# Patient Record
Sex: FEMALE | Race: BLACK OR AFRICAN AMERICAN | NOT HISPANIC OR LATINO | Employment: FULL TIME | URBAN - METROPOLITAN AREA
[De-identification: names, ages, dates, MRNs, and addresses within clinical notes are randomized per-mention and may not be internally consistent; named-entity substitution may affect disease eponyms.]

---

## 2023-02-28 PROBLEM — I70.90 ATHEROSCLEROSIS: Status: ACTIVE | Noted: 2023-02-28

## 2023-02-28 PROBLEM — L30.9 ECZEMA: Status: ACTIVE | Noted: 2023-02-28

## 2023-02-28 PROBLEM — G47.00 INSOMNIA: Status: ACTIVE | Noted: 2023-02-28

## 2023-02-28 PROBLEM — I10 BENIGN HYPERTENSION: Status: ACTIVE | Noted: 2023-02-28

## 2023-02-28 PROBLEM — D50.9 IRON DEFICIENCY ANEMIA: Status: ACTIVE | Noted: 2023-02-28

## 2023-02-28 PROBLEM — E55.9 VITAMIN D DEFICIENCY: Status: ACTIVE | Noted: 2023-02-28

## 2023-02-28 PROBLEM — R20.2 PARESTHESIA: Status: ACTIVE | Noted: 2023-02-28

## 2023-02-28 PROBLEM — Z98.84 S/P BARIATRIC SURGERY: Status: ACTIVE | Noted: 2023-02-28

## 2023-02-28 PROBLEM — K42.9 UMBILICAL HERNIA: Status: ACTIVE | Noted: 2023-02-28

## 2023-02-28 PROBLEM — F41.9 ANXIETY: Status: ACTIVE | Noted: 2023-02-28

## 2023-02-28 PROBLEM — M25.50 ARTHRALGIA OF MULTIPLE SITES: Status: ACTIVE | Noted: 2023-02-28

## 2023-02-28 PROBLEM — R80.9 PROTEINURIA: Status: ACTIVE | Noted: 2023-02-28

## 2023-02-28 PROBLEM — D64.9 ANEMIA: Status: ACTIVE | Noted: 2023-02-28

## 2023-02-28 RX ORDER — LOSARTAN POTASSIUM AND HYDROCHLOROTHIAZIDE 25; 100 MG/1; MG/1
1 TABLET ORAL DAILY
COMMUNITY
Start: 2015-12-14 | End: 2023-04-21 | Stop reason: SDUPTHER

## 2023-02-28 RX ORDER — ACETAMINOPHEN 500 MG
50 TABLET ORAL DAILY
COMMUNITY
Start: 2021-10-29

## 2023-02-28 RX ORDER — ESCITALOPRAM OXALATE 20 MG/1
20 TABLET ORAL DAILY
COMMUNITY
Start: 2022-03-18 | End: 2023-04-21 | Stop reason: SDUPTHER

## 2023-02-28 RX ORDER — CYCLOBENZAPRINE HCL 10 MG
10 TABLET ORAL 2 TIMES DAILY PRN
COMMUNITY
Start: 2022-04-10 | End: 2024-04-24 | Stop reason: HOSPADM

## 2023-02-28 RX ORDER — TRAMADOL HYDROCHLORIDE 50 MG/1
50 TABLET ORAL EVERY 6 HOURS PRN
COMMUNITY
Start: 2016-12-24 | End: 2023-04-21 | Stop reason: ALTCHOICE

## 2023-02-28 RX ORDER — ACETAMINOPHEN 325 MG/1
325 TABLET ORAL EVERY 4 HOURS PRN
COMMUNITY
Start: 2022-04-10 | End: 2024-04-24 | Stop reason: HOSPADM

## 2023-02-28 RX ORDER — AMLODIPINE BESYLATE 10 MG/1
10 TABLET ORAL DAILY
COMMUNITY
Start: 2015-07-29 | End: 2023-04-21 | Stop reason: SDUPTHER

## 2023-02-28 RX ORDER — FERROUS SULFATE 325(65) MG
65 TABLET ORAL
COMMUNITY
Start: 2021-10-29

## 2023-04-14 ENCOUNTER — APPOINTMENT (OUTPATIENT)
Dept: PRIMARY CARE | Facility: CLINIC | Age: 38
End: 2023-04-14
Payer: COMMERCIAL

## 2023-04-21 ENCOUNTER — TELEMEDICINE (OUTPATIENT)
Dept: PRIMARY CARE | Facility: CLINIC | Age: 38
End: 2023-04-21
Payer: COMMERCIAL

## 2023-04-21 DIAGNOSIS — D50.0 IRON DEFICIENCY ANEMIA DUE TO CHRONIC BLOOD LOSS: Primary | ICD-10-CM

## 2023-04-21 DIAGNOSIS — Z98.84 S/P BARIATRIC SURGERY: ICD-10-CM

## 2023-04-21 DIAGNOSIS — E55.9 VITAMIN D DEFICIENCY: ICD-10-CM

## 2023-04-21 DIAGNOSIS — I10 BENIGN HYPERTENSION: ICD-10-CM

## 2023-04-21 DIAGNOSIS — F41.9 ANXIETY: ICD-10-CM

## 2023-04-21 PROCEDURE — 99214 OFFICE O/P EST MOD 30 MIN: CPT | Performed by: INTERNAL MEDICINE

## 2023-04-21 RX ORDER — LOSARTAN POTASSIUM AND HYDROCHLOROTHIAZIDE 25; 100 MG/1; MG/1
1 TABLET ORAL DAILY
Qty: 90 TABLET | Refills: 1 | Status: SHIPPED | OUTPATIENT
Start: 2023-04-21 | End: 2024-01-23

## 2023-04-21 RX ORDER — ESCITALOPRAM OXALATE 20 MG/1
20 TABLET ORAL DAILY
Qty: 90 TABLET | Refills: 1 | Status: SHIPPED | OUTPATIENT
Start: 2023-04-21 | End: 2024-01-29 | Stop reason: SDUPTHER

## 2023-04-21 RX ORDER — ORAL SEMAGLUTIDE 7 MG/1
7 TABLET ORAL DAILY
Qty: 30 TABLET | Refills: 2 | Status: SHIPPED | OUTPATIENT
Start: 2023-04-21 | End: 2024-01-23 | Stop reason: WASHOUT

## 2023-04-21 RX ORDER — AMLODIPINE BESYLATE 10 MG/1
10 TABLET ORAL DAILY
Qty: 90 TABLET | Refills: 1 | OUTPATIENT
Start: 2023-04-21 | End: 2024-01-07

## 2023-04-21 ASSESSMENT — ENCOUNTER SYMPTOMS
BLURRED VISION: 0
SWEATS: 0
PND: 0
NECK PAIN: 0
HEADACHES: 0
HYPERTENSION: 1
ORTHOPNEA: 0
PALPITATIONS: 0
SHORTNESS OF BREATH: 0

## 2023-04-21 NOTE — PROGRESS NOTES
Subjective   Patient ID: Malureese Rios is a 37 y.o. female who presents for Follow-up.  Hypertension  This is a chronic problem. The problem is controlled. Associated symptoms include anxiety. Pertinent negatives include no blurred vision, chest pain, headaches, malaise/fatigue, neck pain, orthopnea, palpitations, peripheral edema, PND, shortness of breath or sweats. Agents associated with hypertension include anorectics.       Review of Systems   Constitutional:  Negative for malaise/fatigue.   Eyes:  Negative for blurred vision.   Respiratory:  Negative for shortness of breath.    Cardiovascular:  Negative for chest pain, palpitations, orthopnea and PND.   Musculoskeletal:  Negative for neck pain.   Neurological:  Negative for headaches.       Objective   Physical Exam  Neurological:      Mental Status: She is alert.   Psychiatric:         Mood and Affect: Mood normal.         Assessment/Plan   Problem List Items Addressed This Visit          Circulatory    Benign hypertension    Relevant Medications    amLODIPine (Norvasc) 10 mg tablet    escitalopram (Lexapro) 20 mg tablet    losartan-hydrochlorothiazide (Hyzaar) 100-25 mg tablet    semaglutide (Rybelsus) 7 mg tablet       Digestive    S/P bariatric surgery    Relevant Medications    semaglutide (Rybelsus) 7 mg tablet       Endocrine/Metabolic    Vitamin D deficiency       Hematologic    Iron deficiency anemia - Primary    Relevant Orders    CBC    Comprehensive Metabolic Panel    Ferritin       Other    Anxiety        An interactive audio and video telecommunication system which permits real time communications between the patient (at the originating site) and provider (at the distant site) was utilized to provide this telehealth service.    Verbal consent was requested and obtained from the patient on the day of encounter.     HTN.  Well controlled.  Continue with current medications.  Check BP at home daily.  Report to us if the numbers are higher than  130/80.  Follow up on anemia  Recheck iron, TIBC and ferritin levels.  Continue with iron supplements.  Eat foods high in iron.      Joseline Remy MD

## 2023-04-28 ENCOUNTER — LAB (OUTPATIENT)
Dept: LAB | Facility: LAB | Age: 38
End: 2023-04-28
Payer: COMMERCIAL

## 2023-04-28 DIAGNOSIS — D50.0 IRON DEFICIENCY ANEMIA DUE TO CHRONIC BLOOD LOSS: ICD-10-CM

## 2023-04-28 LAB
ALANINE AMINOTRANSFERASE (SGPT) (U/L) IN SER/PLAS: 9 U/L (ref 7–45)
ALBUMIN (G/DL) IN SER/PLAS: 4 G/DL (ref 3.4–5)
ALKALINE PHOSPHATASE (U/L) IN SER/PLAS: 71 U/L (ref 33–110)
ANION GAP IN SER/PLAS: 10 MMOL/L (ref 10–20)
ASPARTATE AMINOTRANSFERASE (SGOT) (U/L) IN SER/PLAS: 12 U/L (ref 9–39)
BILIRUBIN TOTAL (MG/DL) IN SER/PLAS: 0.4 MG/DL (ref 0–1.2)
CALCIUM (MG/DL) IN SER/PLAS: 9.4 MG/DL (ref 8.6–10.6)
CARBON DIOXIDE, TOTAL (MMOL/L) IN SER/PLAS: 28 MMOL/L (ref 21–32)
CHLORIDE (MMOL/L) IN SER/PLAS: 105 MMOL/L (ref 98–107)
CREATININE (MG/DL) IN SER/PLAS: 0.8 MG/DL (ref 0.5–1.05)
ERYTHROCYTE DISTRIBUTION WIDTH (RATIO) BY AUTOMATED COUNT: 16.8 % (ref 11.5–14.5)
ERYTHROCYTE MEAN CORPUSCULAR HEMOGLOBIN CONCENTRATION (G/DL) BY AUTOMATED: 28.6 G/DL (ref 32–36)
ERYTHROCYTE MEAN CORPUSCULAR VOLUME (FL) BY AUTOMATED COUNT: 74 FL (ref 80–100)
ERYTHROCYTES (10*6/UL) IN BLOOD BY AUTOMATED COUNT: 3.87 X10E12/L (ref 4–5.2)
FERRITIN (UG/LL) IN SER/PLAS: 10 UG/L (ref 8–150)
GFR FEMALE: >90 ML/MIN/1.73M2
GLUCOSE (MG/DL) IN SER/PLAS: 83 MG/DL (ref 74–99)
HEMATOCRIT (%) IN BLOOD BY AUTOMATED COUNT: 28.7 % (ref 36–46)
HEMOGLOBIN (G/DL) IN BLOOD: 8.2 G/DL (ref 12–16)
LEUKOCYTES (10*3/UL) IN BLOOD BY AUTOMATED COUNT: 7 X10E9/L (ref 4.4–11.3)
NRBC (PER 100 WBCS) BY AUTOMATED COUNT: 0 /100 WBC (ref 0–0)
PLATELETS (10*3/UL) IN BLOOD AUTOMATED COUNT: 321 X10E9/L (ref 150–450)
POTASSIUM (MMOL/L) IN SER/PLAS: 3.9 MMOL/L (ref 3.5–5.3)
PROTEIN TOTAL: 7.6 G/DL (ref 6.4–8.2)
SODIUM (MMOL/L) IN SER/PLAS: 139 MMOL/L (ref 136–145)
UREA NITROGEN (MG/DL) IN SER/PLAS: 11 MG/DL (ref 6–23)

## 2023-04-28 PROCEDURE — 36415 COLL VENOUS BLD VENIPUNCTURE: CPT

## 2023-04-28 PROCEDURE — 85027 COMPLETE CBC AUTOMATED: CPT

## 2023-04-28 PROCEDURE — 80053 COMPREHEN METABOLIC PANEL: CPT

## 2023-04-28 PROCEDURE — 82728 ASSAY OF FERRITIN: CPT

## 2023-04-29 NOTE — RESULT ENCOUNTER NOTE
Left a detailed message with the patient.  She should start oral iron supplements, 65 mg BID and increase iron in the diet.  She should follow up with us in 3 months to recheck levels.

## 2024-01-07 ENCOUNTER — HOSPITAL ENCOUNTER (EMERGENCY)
Facility: HOSPITAL | Age: 39
Discharge: HOME | End: 2024-01-07
Attending: EMERGENCY MEDICINE
Payer: COMMERCIAL

## 2024-01-07 ENCOUNTER — APPOINTMENT (OUTPATIENT)
Dept: RADIOLOGY | Facility: HOSPITAL | Age: 39
End: 2024-01-07
Payer: COMMERCIAL

## 2024-01-07 ENCOUNTER — APPOINTMENT (OUTPATIENT)
Dept: CARDIOLOGY | Facility: HOSPITAL | Age: 39
End: 2024-01-07
Payer: COMMERCIAL

## 2024-01-07 VITALS
RESPIRATION RATE: 18 BRPM | DIASTOLIC BLOOD PRESSURE: 111 MMHG | HEART RATE: 80 BPM | OXYGEN SATURATION: 98 % | TEMPERATURE: 98.2 F | SYSTOLIC BLOOD PRESSURE: 174 MMHG

## 2024-01-07 DIAGNOSIS — D64.9 ANEMIA, UNSPECIFIED TYPE: ICD-10-CM

## 2024-01-07 DIAGNOSIS — R11.11 VOMITING WITHOUT NAUSEA, UNSPECIFIED VOMITING TYPE: ICD-10-CM

## 2024-01-07 DIAGNOSIS — R51.9 ACUTE NONINTRACTABLE HEADACHE, UNSPECIFIED HEADACHE TYPE: Primary | ICD-10-CM

## 2024-01-07 DIAGNOSIS — I10 BENIGN HYPERTENSION: ICD-10-CM

## 2024-01-07 LAB
ALBUMIN SERPL BCP-MCNC: 4.1 G/DL (ref 3.4–5)
ALP SERPL-CCNC: 54 U/L (ref 33–110)
ALT SERPL W P-5'-P-CCNC: 11 U/L (ref 7–45)
ANION GAP SERPL CALC-SCNC: 14 MMOL/L (ref 10–20)
AST SERPL W P-5'-P-CCNC: 19 U/L (ref 9–39)
B-HCG SERPL-ACNC: <2 MIU/ML
BASOPHILS # BLD AUTO: 0.08 X10*3/UL (ref 0–0.1)
BASOPHILS NFR BLD AUTO: 1.1 %
BILIRUB SERPL-MCNC: 0.3 MG/DL (ref 0–1.2)
BUN SERPL-MCNC: 11 MG/DL (ref 6–23)
CALCIUM SERPL-MCNC: 10 MG/DL (ref 8.6–10.3)
CARDIAC TROPONIN I PNL SERPL HS: 4 NG/L (ref 0–13)
CHLORIDE SERPL-SCNC: 105 MMOL/L (ref 98–107)
CO2 SERPL-SCNC: 23 MMOL/L (ref 21–32)
CREAT SERPL-MCNC: 0.68 MG/DL (ref 0.5–1.05)
EOSINOPHIL # BLD AUTO: 0.17 X10*3/UL (ref 0–0.7)
EOSINOPHIL NFR BLD AUTO: 2.4 %
ERYTHROCYTE [DISTWIDTH] IN BLOOD BY AUTOMATED COUNT: 18.6 % (ref 11.5–14.5)
FLUAV RNA RESP QL NAA+PROBE: NOT DETECTED
FLUBV RNA RESP QL NAA+PROBE: NOT DETECTED
GFR SERPL CREATININE-BSD FRML MDRD: >90 ML/MIN/1.73M*2
GLUCOSE SERPL-MCNC: 92 MG/DL (ref 74–99)
HCT VFR BLD AUTO: 25 % (ref 36–46)
HEMOCCULT SP1 STL QL: NEGATIVE
HGB BLD-MCNC: 7.3 G/DL (ref 12–16)
IMM GRANULOCYTES # BLD AUTO: 0.02 X10*3/UL (ref 0–0.7)
IMM GRANULOCYTES NFR BLD AUTO: 0.3 % (ref 0–0.9)
LYMPHOCYTES # BLD AUTO: 2.46 X10*3/UL (ref 1.2–4.8)
LYMPHOCYTES NFR BLD AUTO: 34.3 %
MCH RBC QN AUTO: 19.5 PG (ref 26–34)
MCHC RBC AUTO-ENTMCNC: 29.2 G/DL (ref 32–36)
MCV RBC AUTO: 67 FL (ref 80–100)
MONOCYTES # BLD AUTO: 0.46 X10*3/UL (ref 0.1–1)
MONOCYTES NFR BLD AUTO: 6.4 %
NEUTROPHILS # BLD AUTO: 3.98 X10*3/UL (ref 1.2–7.7)
NEUTROPHILS NFR BLD AUTO: 55.5 %
NRBC BLD-RTO: 0 /100 WBCS (ref 0–0)
PLATELET # BLD AUTO: 314 X10*3/UL (ref 150–450)
POTASSIUM SERPL-SCNC: 3.2 MMOL/L (ref 3.5–5.3)
PROT SERPL-MCNC: 8 G/DL (ref 6.4–8.2)
RBC # BLD AUTO: 3.75 X10*6/UL (ref 4–5.2)
SARS-COV-2 RNA RESP QL NAA+PROBE: NOT DETECTED
SODIUM SERPL-SCNC: 139 MMOL/L (ref 136–145)
WBC # BLD AUTO: 7.2 X10*3/UL (ref 4.4–11.3)

## 2024-01-07 PROCEDURE — 93005 ELECTROCARDIOGRAM TRACING: CPT

## 2024-01-07 PROCEDURE — 70450 CT HEAD/BRAIN W/O DYE: CPT

## 2024-01-07 PROCEDURE — 87636 SARSCOV2 & INF A&B AMP PRB: CPT | Performed by: EMERGENCY MEDICINE

## 2024-01-07 PROCEDURE — 2500000004 HC RX 250 GENERAL PHARMACY W/ HCPCS (ALT 636 FOR OP/ED): Performed by: EMERGENCY MEDICINE

## 2024-01-07 PROCEDURE — 84484 ASSAY OF TROPONIN QUANT: CPT | Performed by: EMERGENCY MEDICINE

## 2024-01-07 PROCEDURE — 96375 TX/PRO/DX INJ NEW DRUG ADDON: CPT

## 2024-01-07 PROCEDURE — 96361 HYDRATE IV INFUSION ADD-ON: CPT

## 2024-01-07 PROCEDURE — 84702 CHORIONIC GONADOTROPIN TEST: CPT | Performed by: EMERGENCY MEDICINE

## 2024-01-07 PROCEDURE — 70450 CT HEAD/BRAIN W/O DYE: CPT | Performed by: RADIOLOGY

## 2024-01-07 PROCEDURE — 99284 EMERGENCY DEPT VISIT MOD MDM: CPT | Performed by: EMERGENCY MEDICINE

## 2024-01-07 PROCEDURE — 96374 THER/PROPH/DIAG INJ IV PUSH: CPT

## 2024-01-07 PROCEDURE — 36415 COLL VENOUS BLD VENIPUNCTURE: CPT | Performed by: EMERGENCY MEDICINE

## 2024-01-07 PROCEDURE — 99285 EMERGENCY DEPT VISIT HI MDM: CPT | Mod: 25

## 2024-01-07 PROCEDURE — 80053 COMPREHEN METABOLIC PANEL: CPT | Performed by: EMERGENCY MEDICINE

## 2024-01-07 PROCEDURE — 85025 COMPLETE CBC W/AUTO DIFF WBC: CPT | Performed by: EMERGENCY MEDICINE

## 2024-01-07 PROCEDURE — 82270 OCCULT BLOOD FECES: CPT | Performed by: EMERGENCY MEDICINE

## 2024-01-07 PROCEDURE — 2500000001 HC RX 250 WO HCPCS SELF ADMINISTERED DRUGS (ALT 637 FOR MEDICARE OP): Performed by: EMERGENCY MEDICINE

## 2024-01-07 RX ORDER — PROCHLORPERAZINE EDISYLATE 5 MG/ML
10 INJECTION INTRAMUSCULAR; INTRAVENOUS ONCE
Status: COMPLETED | OUTPATIENT
Start: 2024-01-07 | End: 2024-01-07

## 2024-01-07 RX ORDER — AMLODIPINE BESYLATE 10 MG/1
10 TABLET ORAL DAILY
Status: DISCONTINUED | OUTPATIENT
Start: 2024-01-07 | End: 2024-01-07 | Stop reason: HOSPADM

## 2024-01-07 RX ORDER — AMLODIPINE BESYLATE 5 MG/1
10 TABLET ORAL DAILY
Qty: 60 TABLET | Refills: 0 | Status: SHIPPED | OUTPATIENT
Start: 2024-01-07 | End: 2024-05-20 | Stop reason: ALTCHOICE

## 2024-01-07 RX ORDER — DIPHENHYDRAMINE HYDROCHLORIDE 50 MG/ML
25 INJECTION INTRAMUSCULAR; INTRAVENOUS ONCE
Status: COMPLETED | OUTPATIENT
Start: 2024-01-07 | End: 2024-01-07

## 2024-01-07 RX ORDER — KETOROLAC TROMETHAMINE 30 MG/ML
30 INJECTION, SOLUTION INTRAMUSCULAR; INTRAVENOUS ONCE
Status: COMPLETED | OUTPATIENT
Start: 2024-01-07 | End: 2024-01-07

## 2024-01-07 RX ADMIN — SODIUM CHLORIDE 1000 ML: 9 INJECTION, SOLUTION INTRAVENOUS at 04:45

## 2024-01-07 RX ADMIN — KETOROLAC TROMETHAMINE 30 MG: 30 INJECTION, SOLUTION INTRAMUSCULAR; INTRAVENOUS at 04:45

## 2024-01-07 RX ADMIN — DIPHENHYDRAMINE HYDROCHLORIDE 25 MG: 50 INJECTION, SOLUTION INTRAMUSCULAR; INTRAVENOUS at 04:46

## 2024-01-07 RX ADMIN — AMLODIPINE BESYLATE 10 MG: 10 TABLET ORAL at 07:57

## 2024-01-07 RX ADMIN — PROCHLORPERAZINE EDISYLATE 10 MG: 5 INJECTION INTRAMUSCULAR; INTRAVENOUS at 04:46

## 2024-01-07 ASSESSMENT — PAIN SCALES - GENERAL: PAINLEVEL_OUTOF10: 10 - WORST POSSIBLE PAIN

## 2024-01-07 ASSESSMENT — PAIN DESCRIPTION - DESCRIPTORS: DESCRIPTORS: ACHING

## 2024-01-07 ASSESSMENT — PAIN - FUNCTIONAL ASSESSMENT: PAIN_FUNCTIONAL_ASSESSMENT: 0-10

## 2024-01-07 ASSESSMENT — PAIN DESCRIPTION - PAIN TYPE: TYPE: ACUTE PAIN

## 2024-01-07 ASSESSMENT — PAIN DESCRIPTION - LOCATION: LOCATION: HEAD

## 2024-01-07 ASSESSMENT — COLUMBIA-SUICIDE SEVERITY RATING SCALE - C-SSRS
1. IN THE PAST MONTH, HAVE YOU WISHED YOU WERE DEAD OR WISHED YOU COULD GO TO SLEEP AND NOT WAKE UP?: NO
2. HAVE YOU ACTUALLY HAD ANY THOUGHTS OF KILLING YOURSELF?: NO
6. HAVE YOU EVER DONE ANYTHING, STARTED TO DO ANYTHING, OR PREPARED TO DO ANYTHING TO END YOUR LIFE?: NO

## 2024-01-07 NOTE — ED PROVIDER NOTES
HPI   Chief Complaint   Patient presents with    Headache    Nausea       HPI  The patient presents with headache and blurred vision today.  She has known anemia for which he eats excess drop.  She does not take any iron.  She does states she has heavy periods patient has had blood in the stool.  Denies any fever, cough, vomiting.  Says she has mild nausea with this.                  Eugene Coma Scale Score: 15                  Patient History   Past Medical History:   Diagnosis Date    Essential (primary) hypertension 2013    Hypertension    Personal history of other endocrine, nutritional and metabolic disease 2022    History of morbid obesity    Personal history of other specified conditions     History of abdominal pain     Past Surgical History:   Procedure Laterality Date     SECTION, CLASSIC  2016     Section    OTHER SURGICAL HISTORY  2016    Ovarian Cystectomy    TUBAL LIGATION  2016    Tubal Ligation     No family history on file.  Social History     Tobacco Use    Smoking status: Not on file    Smokeless tobacco: Not on file   Substance Use Topics    Alcohol use: Not on file    Drug use: Not on file       Physical Exam   ED Triage Vitals [24 0415]   Temp Heart Rate Resp BP   36.8 °C (98.2 °F) 89 18 (!) 181/121      SpO2 Temp Source Heart Rate Source Patient Position   97 % Temporal Monitor --      BP Location FiO2 (%)     -- --       Physical Exam  Vitals and nursing note reviewed.   Constitutional:       General: She is not in acute distress.     Appearance: She is well-developed.   HENT:      Head: Normocephalic and atraumatic.   Eyes:      General: No visual field deficit.     Conjunctiva/sclera: Conjunctivae normal.   Cardiovascular:      Rate and Rhythm: Normal rate and regular rhythm.      Heart sounds: No murmur heard.  Pulmonary:      Effort: Pulmonary effort is normal. No respiratory distress.      Breath sounds: Normal breath sounds.    Abdominal:      Palpations: Abdomen is soft.      Tenderness: There is no abdominal tenderness.   Musculoskeletal:         General: No swelling.      Cervical back: Neck supple.   Skin:     General: Skin is warm and dry.      Capillary Refill: Capillary refill takes less than 2 seconds.   Neurological:      Mental Status: She is alert.      GCS: GCS eye subscore is 4. GCS verbal subscore is 5. GCS motor subscore is 5.      Cranial Nerves: No cranial nerve deficit, dysarthria or facial asymmetry.   Psychiatric:         Mood and Affect: Mood normal.         ED Course & MDM   Diagnoses as of 01/07/24 0794   Acute nonintractable headache, unspecified headache type   Vomiting without nausea, unspecified vomiting type   Anemia, unspecified type       Medical Decision Making  The patient has a benign neuroexam.  No visual field deficits.  No double vision.  The patient has a CT scan was unremarkable.NIH of 0.  I performed a rectal exam with nurse chaperone Kaley.  There was white material in her rectum.  No dark black tarry stools or hematochezia.    EKG interpreted by myself.  Normal sinus rhythm at a rate of 72 bpm.  Normal intervals.  Normal axis.  No signs of acute ischemia.      Procedure  Procedures     Dennis Esqueda MD  01/07/24 8382

## 2024-01-10 LAB
ATRIAL RATE: 72 BPM
P AXIS: 35 DEGREES
P OFFSET: 186 MS
P ONSET: 128 MS
PR INTERVAL: 186 MS
Q ONSET: 221 MS
QRS COUNT: 11 BEATS
QRS DURATION: 88 MS
QT INTERVAL: 398 MS
QTC CALCULATION(BAZETT): 435 MS
QTC FREDERICIA: 423 MS
R AXIS: 28 DEGREES
T AXIS: 63 DEGREES
T OFFSET: 420 MS
VENTRICULAR RATE: 72 BPM

## 2024-01-23 ENCOUNTER — OFFICE VISIT (OUTPATIENT)
Dept: PRIMARY CARE | Facility: CLINIC | Age: 39
End: 2024-01-23
Payer: COMMERCIAL

## 2024-01-23 ENCOUNTER — LAB (OUTPATIENT)
Dept: LAB | Facility: LAB | Age: 39
End: 2024-01-23
Payer: COMMERCIAL

## 2024-01-23 VITALS — DIASTOLIC BLOOD PRESSURE: 80 MMHG | BODY MASS INDEX: 34.54 KG/M2 | WEIGHT: 214 LBS | SYSTOLIC BLOOD PRESSURE: 140 MMHG

## 2024-01-23 DIAGNOSIS — Z98.84 S/P BARIATRIC SURGERY: ICD-10-CM

## 2024-01-23 DIAGNOSIS — D50.0 IRON DEFICIENCY ANEMIA DUE TO CHRONIC BLOOD LOSS: ICD-10-CM

## 2024-01-23 DIAGNOSIS — D50.9 IRON DEFICIENCY ANEMIA, UNSPECIFIED IRON DEFICIENCY ANEMIA TYPE: ICD-10-CM

## 2024-01-23 DIAGNOSIS — I10 BENIGN HYPERTENSION: ICD-10-CM

## 2024-01-23 DIAGNOSIS — I10 BENIGN HYPERTENSION: Primary | ICD-10-CM

## 2024-01-23 LAB
25(OH)D3 SERPL-MCNC: 45 NG/ML (ref 30–100)
BASOPHILS # BLD MANUAL: 0.14 X10*3/UL (ref 0–0.1)
BASOPHILS NFR BLD MANUAL: 1.7 %
EOSINOPHIL # BLD MANUAL: 0.07 X10*3/UL (ref 0–0.7)
EOSINOPHIL NFR BLD MANUAL: 0.9 %
ERYTHROCYTE [DISTWIDTH] IN BLOOD BY AUTOMATED COUNT: 20.1 % (ref 11.5–14.5)
EST. AVERAGE GLUCOSE BLD GHB EST-MCNC: 94 MG/DL
HBA1C MFR BLD: 4.9 %
HCT VFR BLD AUTO: 28.9 % (ref 36–46)
HGB BLD-MCNC: 8.2 G/DL (ref 12–16)
HYPOCHROMIA BLD QL SMEAR: ABNORMAL
IMM GRANULOCYTES # BLD AUTO: 0.02 X10*3/UL (ref 0–0.7)
IMM GRANULOCYTES NFR BLD AUTO: 0.2 % (ref 0–0.9)
LYMPHOCYTES # BLD MANUAL: 2.43 X10*3/UL (ref 1.2–4.8)
LYMPHOCYTES NFR BLD MANUAL: 29.3 %
MCH RBC QN AUTO: 19.6 PG (ref 26–34)
MCHC RBC AUTO-ENTMCNC: 28.4 G/DL (ref 32–36)
MCV RBC AUTO: 69 FL (ref 80–100)
MONOCYTES # BLD MANUAL: 0.28 X10*3/UL (ref 0.1–1)
MONOCYTES NFR BLD MANUAL: 3.4 %
NEUTS SEG # BLD MANUAL: 5.37 X10*3/UL (ref 1.2–7)
NEUTS SEG NFR BLD MANUAL: 64.7 %
NRBC BLD-RTO: 0 /100 WBCS (ref 0–0)
PLATELET # BLD AUTO: 385 X10*3/UL (ref 150–450)
RBC # BLD AUTO: 4.19 X10*6/UL (ref 4–5.2)
RBC MORPH BLD: ABNORMAL
TOTAL CELLS COUNTED BLD: 116
TSH SERPL-ACNC: 1.37 MIU/L (ref 0.44–3.98)
WBC # BLD AUTO: 8.3 X10*3/UL (ref 4.4–11.3)

## 2024-01-23 PROCEDURE — 80053 COMPREHEN METABOLIC PANEL: CPT

## 2024-01-23 PROCEDURE — 83036 HEMOGLOBIN GLYCOSYLATED A1C: CPT

## 2024-01-23 PROCEDURE — 99214 OFFICE O/P EST MOD 30 MIN: CPT | Performed by: INTERNAL MEDICINE

## 2024-01-23 PROCEDURE — 82306 VITAMIN D 25 HYDROXY: CPT

## 2024-01-23 PROCEDURE — 3077F SYST BP >= 140 MM HG: CPT | Performed by: INTERNAL MEDICINE

## 2024-01-23 PROCEDURE — 85027 COMPLETE CBC AUTOMATED: CPT

## 2024-01-23 PROCEDURE — 85007 BL SMEAR W/DIFF WBC COUNT: CPT

## 2024-01-23 PROCEDURE — 82728 ASSAY OF FERRITIN: CPT

## 2024-01-23 PROCEDURE — 83540 ASSAY OF IRON: CPT

## 2024-01-23 PROCEDURE — 36415 COLL VENOUS BLD VENIPUNCTURE: CPT

## 2024-01-23 PROCEDURE — 84443 ASSAY THYROID STIM HORMONE: CPT

## 2024-01-23 PROCEDURE — 83550 IRON BINDING TEST: CPT

## 2024-01-23 PROCEDURE — 3079F DIAST BP 80-89 MM HG: CPT | Performed by: INTERNAL MEDICINE

## 2024-01-23 PROCEDURE — 80061 LIPID PANEL: CPT

## 2024-01-23 RX ORDER — FAMOTIDINE 10 MG/ML
20 INJECTION INTRAVENOUS ONCE AS NEEDED
Status: CANCELLED | OUTPATIENT
Start: 2024-02-07

## 2024-01-23 RX ORDER — IRON SUCROSE 20 MG/ML
200 INJECTION, SOLUTION INTRAVENOUS
Qty: 10 ML | Refills: 1 | Status: SHIPPED
Start: 2024-01-23

## 2024-01-23 RX ORDER — ALBUTEROL SULFATE 0.83 MG/ML
3 SOLUTION RESPIRATORY (INHALATION) AS NEEDED
Status: CANCELLED | OUTPATIENT
Start: 2024-02-07

## 2024-01-23 RX ORDER — DIPHENHYDRAMINE HYDROCHLORIDE 50 MG/ML
50 INJECTION INTRAMUSCULAR; INTRAVENOUS AS NEEDED
Status: CANCELLED | OUTPATIENT
Start: 2024-02-07

## 2024-01-23 RX ORDER — EPINEPHRINE 0.3 MG/.3ML
0.3 INJECTION SUBCUTANEOUS EVERY 5 MIN PRN
Status: CANCELLED | OUTPATIENT
Start: 2024-02-07

## 2024-01-23 RX ORDER — LOSARTAN POTASSIUM AND HYDROCHLOROTHIAZIDE 12.5; 1 MG/1; MG/1
1 TABLET ORAL DAILY
Qty: 30 TABLET | Refills: 11 | Status: SHIPPED
Start: 2024-01-23 | End: 2024-01-24 | Stop reason: SDUPTHER

## 2024-01-23 ASSESSMENT — PATIENT HEALTH QUESTIONNAIRE - PHQ9
SUM OF ALL RESPONSES TO PHQ9 QUESTIONS 1 AND 2: 0
2. FEELING DOWN, DEPRESSED OR HOPELESS: NOT AT ALL
1. LITTLE INTEREST OR PLEASURE IN DOING THINGS: NOT AT ALL

## 2024-01-23 NOTE — PROGRESS NOTES
I saw and evaluated the patient. I personally obtained the key and critical portions of the history and physical exam or was physically present for key and critical portions performed by the resident/fellow. I reviewed the resident/fellow's documentation and discussed the patient with the resident/fellow. I agree with the resident/fellow's medical decision making as documented in the note.    Joseline Remy MD

## 2024-01-23 NOTE — PROGRESS NOTES
Chief Complaint:   Chief Complaint   Patient presents with    Follow-up    Med Refill      HPI    Malu Rios is a 38 y.o. year old female who presents to the clinic for a follow up visit. She states that over the last month she has been feeling more fatigued than usual. She was getting adequate sleep and was still waking up tired. It got to the point where she couldn't deal with it anymore and so she decided to go to the ED. At the ED, the patient was noted to have an anemia with a Hemoglobin of 7.5. This was suspected to be in the setting of Iron deficiency and she was discharged and told to follow up. She has been having normal menstrual periods. She has them every month and states that they are heavier in the first few days but the bleeding didn't seem unusually heavy and they lasted about 6-7 days. She did not notice any new bleeding or easy bruising.         Objective   Vitals:    01/23/24 1337   BP: 140/80        BMI Readings from Last 15 Encounters:   01/23/24 34.54 kg/m²   01/13/23 35.99 kg/m²   06/17/22 35.19 kg/m²   03/18/22 35.35 kg/m²   10/27/21 35.02 kg/m²        Physical Exam  Physical Exam  HENT:      Head: Normocephalic and atraumatic.   Cardiovascular:      Rate and Rhythm: Normal rate and regular rhythm.      Pulses: Normal pulses.      Heart sounds: Normal heart sounds.   Pulmonary:      Effort: Pulmonary effort is normal.      Breath sounds: Normal breath sounds.   Abdominal:      General: Abdomen is flat. Bowel sounds are normal.      Palpations: Abdomen is soft.   Neurological:      Mental Status: She is alert.          Labs:  Lab Results   Component Value Date    WBC 7.2 01/07/2024    HGB 7.3 (L) 01/07/2024    HCT 25.0 (L) 01/07/2024    MCV 67 (L) 01/07/2024     01/07/2024     Lab Results   Component Value Date    GLUCOSE 92 01/07/2024    CALCIUM 10.0 01/07/2024     01/07/2024    K 3.2 (L) 01/07/2024    CO2 23 01/07/2024     01/07/2024    BUN 11 01/07/2024     CREATININE 0.68 01/07/2024     Lab Results   Component Value Date    HGBA1C 5.4 09/20/2022      Lab Results   Component Value Date    HGBA1C 5.4 09/20/2022    HGBA1C 5.2 10/27/2021    HGBA1C 4.9 06/02/2020     Lab Results   Component Value Date    TSH 1.78 10/27/2021       Current Medications:  Current Outpatient Medications   Medication Sig Dispense Refill    acetaminophen (TylenoL) 325 mg tablet Take 1 tablet (325 mg) by mouth every 4 hours if needed for mild pain (1 - 3).      amLODIPine (Norvasc) 5 mg tablet Take 2 tablets (10 mg) by mouth once daily. 60 tablet 0    cholecalciferol (Vitamin D-3) 50 mcg (2,000 unit) capsule Take 1 capsule (50 mcg) by mouth once daily.      cyclobenzaprine (Flexeril) 10 mg tablet Take 1 tablet (10 mg) by mouth 2 times a day as needed for muscle spasms.      escitalopram (Lexapro) 20 mg tablet Take 1 tablet (20 mg) by mouth once daily. 90 tablet 1    ferrous sulfate 325 (65 Fe) MG tablet Take 1 tablet (65 mg of iron) by mouth once daily with a meal.      losartan-hydrochlorothiazide (Hyzaar) 100-25 mg tablet Take 1 tablet by mouth once daily. 90 tablet 1    semaglutide (Rybelsus) 7 mg tablet Take 7 mg by mouth once daily. 30 tablet 2     No current facility-administered medications for this visit.     Assessment and Plan    # HTN  - Current Regimen: Losartan-hydrochlorothiazide 100-25 mg every day, Amlodipine 5 mg every day   [ ] Decrease Losartan-hydrochlorothiazide 100-12.5 mg every day     # Hypokalemia   - K 3.2   [ ] Decrease hydrochlorothiazide dose as above  [ ] CMP to monitor     # Iron Deficiency   [ ] Venofer Infusions     # Weight Loss  2/2 Anemia and Poor Appetite/Fatigue  - 6 pound weight loss in 10 days  [ ] Monitor for continued weight loss    # Depression  - Escitalopram 20 mg every day     # Vitamin D Deficiency  - c/w Vitamin D 2000 Units every day     Labs:  - CBC, CMP, Urine Albumin, TSH w/ T4, Lipid Panel, HbA1c, Vitamin D due, requisite form provided      Please follow up in 3 months.

## 2024-01-24 DIAGNOSIS — I10 BENIGN HYPERTENSION: ICD-10-CM

## 2024-01-24 LAB
ALBUMIN SERPL BCP-MCNC: 4.4 G/DL (ref 3.4–5)
ALP SERPL-CCNC: 58 U/L (ref 33–110)
ALT SERPL W P-5'-P-CCNC: 9 U/L (ref 7–45)
ANION GAP SERPL CALC-SCNC: 17 MMOL/L (ref 10–20)
AST SERPL W P-5'-P-CCNC: 11 U/L (ref 9–39)
BILIRUB SERPL-MCNC: 0.3 MG/DL (ref 0–1.2)
BUN SERPL-MCNC: 14 MG/DL (ref 6–23)
CALCIUM SERPL-MCNC: 10.3 MG/DL (ref 8.6–10.6)
CHLORIDE SERPL-SCNC: 103 MMOL/L (ref 98–107)
CHOLEST SERPL-MCNC: 148 MG/DL (ref 0–199)
CHOLESTEROL/HDL RATIO: 2.5
CO2 SERPL-SCNC: 23 MMOL/L (ref 21–32)
CREAT SERPL-MCNC: 0.87 MG/DL (ref 0.5–1.05)
EGFRCR SERPLBLD CKD-EPI 2021: 87 ML/MIN/1.73M*2
FERRITIN SERPL-MCNC: 11 NG/ML (ref 8–150)
GLUCOSE SERPL-MCNC: 77 MG/DL (ref 74–99)
HDLC SERPL-MCNC: 60.3 MG/DL
IRON SATN MFR SERPL: ABNORMAL %
IRON SERPL-MCNC: <10 UG/DL (ref 35–150)
LDLC SERPL CALC-MCNC: 74 MG/DL
NON HDL CHOLESTEROL: 88 MG/DL (ref 0–149)
POTASSIUM SERPL-SCNC: 3.8 MMOL/L (ref 3.5–5.3)
PROT SERPL-MCNC: 8.3 G/DL (ref 6.4–8.2)
SODIUM SERPL-SCNC: 139 MMOL/L (ref 136–145)
TIBC SERPL-MCNC: ABNORMAL UG/DL
TRIGL SERPL-MCNC: 68 MG/DL (ref 0–149)
UIBC SERPL-MCNC: >450 UG/DL (ref 110–370)
VLDL: 14 MG/DL (ref 0–40)

## 2024-01-24 RX ORDER — LOSARTAN POTASSIUM AND HYDROCHLOROTHIAZIDE 12.5; 1 MG/1; MG/1
1 TABLET ORAL DAILY
Qty: 30 TABLET | Refills: 11 | Status: SHIPPED | OUTPATIENT
Start: 2024-01-24 | End: 2025-01-23

## 2024-01-29 DIAGNOSIS — F41.9 ANXIETY: ICD-10-CM

## 2024-01-29 DIAGNOSIS — I10 BENIGN HYPERTENSION: ICD-10-CM

## 2024-01-29 RX ORDER — ESCITALOPRAM OXALATE 20 MG/1
20 TABLET ORAL DAILY
Qty: 90 TABLET | Refills: 0 | Status: SHIPPED | OUTPATIENT
Start: 2024-01-29 | End: 2024-04-26

## 2024-02-01 ENCOUNTER — DOCUMENTATION (OUTPATIENT)
Dept: INFUSION THERAPY | Facility: CLINIC | Age: 39
End: 2024-02-01
Payer: COMMERCIAL

## 2024-02-22 ENCOUNTER — DOCUMENTATION (OUTPATIENT)
Dept: INFUSION THERAPY | Facility: CLINIC | Age: 39
End: 2024-02-22
Payer: COMMERCIAL

## 2024-03-15 ENCOUNTER — DOCUMENTATION (OUTPATIENT)
Dept: INFUSION THERAPY | Facility: CLINIC | Age: 39
End: 2024-03-15
Payer: COMMERCIAL

## 2024-04-24 ENCOUNTER — OFFICE VISIT (OUTPATIENT)
Dept: OBSTETRICS AND GYNECOLOGY | Facility: CLINIC | Age: 39
End: 2024-04-24
Payer: COMMERCIAL

## 2024-04-24 VITALS
BODY MASS INDEX: 35.65 KG/M2 | HEIGHT: 66 IN | DIASTOLIC BLOOD PRESSURE: 118 MMHG | WEIGHT: 221.8 LBS | SYSTOLIC BLOOD PRESSURE: 172 MMHG

## 2024-04-24 DIAGNOSIS — Z01.419 ENCOUNTER FOR WELL WOMAN EXAM WITH ROUTINE GYNECOLOGICAL EXAM: Primary | ICD-10-CM

## 2024-04-24 DIAGNOSIS — N89.8 VAGINAL ODOR: ICD-10-CM

## 2024-04-24 DIAGNOSIS — N93.9 ABNORMAL UTERINE BLEEDING (AUB): ICD-10-CM

## 2024-04-24 DIAGNOSIS — D50.9 IRON DEFICIENCY ANEMIA, UNSPECIFIED IRON DEFICIENCY ANEMIA TYPE: ICD-10-CM

## 2024-04-24 DIAGNOSIS — Z80.3 FAMILY HISTORY OF MALIGNANT NEOPLASM OF FEMALE BREAST: ICD-10-CM

## 2024-04-24 PROCEDURE — 1036F TOBACCO NON-USER: CPT | Performed by: OBSTETRICS & GYNECOLOGY

## 2024-04-24 PROCEDURE — 3077F SYST BP >= 140 MM HG: CPT | Performed by: OBSTETRICS & GYNECOLOGY

## 2024-04-24 PROCEDURE — 3080F DIAST BP >= 90 MM HG: CPT | Performed by: OBSTETRICS & GYNECOLOGY

## 2024-04-24 PROCEDURE — 99386 PREV VISIT NEW AGE 40-64: CPT | Performed by: OBSTETRICS & GYNECOLOGY

## 2024-04-24 PROCEDURE — 99214 OFFICE O/P EST MOD 30 MIN: CPT | Performed by: OBSTETRICS & GYNECOLOGY

## 2024-04-24 PROCEDURE — 87205 SMEAR GRAM STAIN: CPT

## 2024-04-24 ASSESSMENT — ENCOUNTER SYMPTOMS
PSYCHIATRIC NEGATIVE: 0
HEMATOLOGIC/LYMPHATIC NEGATIVE: 0
CARDIOVASCULAR NEGATIVE: 0
CONSTITUTIONAL NEGATIVE: 0
DEPRESSION: 0
OCCASIONAL FEELINGS OF UNSTEADINESS: 0
ALLERGIC/IMMUNOLOGIC NEGATIVE: 0
RESPIRATORY NEGATIVE: 0
MUSCULOSKELETAL NEGATIVE: 0
GASTROINTESTINAL NEGATIVE: 0
ENDOCRINE NEGATIVE: 0
EYES NEGATIVE: 0
LOSS OF SENSATION IN FEET: 0
NEUROLOGICAL NEGATIVE: 0

## 2024-04-24 ASSESSMENT — PATIENT HEALTH QUESTIONNAIRE - PHQ9
2. FEELING DOWN, DEPRESSED OR HOPELESS: NOT AT ALL
SUM OF ALL RESPONSES TO PHQ9 QUESTIONS 1 AND 2: 0
1. LITTLE INTEREST OR PLEASURE IN DOING THINGS: NOT AT ALL

## 2024-04-24 ASSESSMENT — PAIN SCALES - GENERAL: PAINLEVEL: 0-NO PAIN

## 2024-04-24 NOTE — PROGRESS NOTES
History Of Present Illness  Routine Gyn Exam  Malu Rios here for routine WWE.  Her medical hx is notable for HTN and KATY.  Hemoglobin 8.2 in 2024.  Menses are monthly, lasting 7d.  Wears pads-- on her heavy days she uses 6-7 pads. Pads are saturated; she does pass clots about the size of a golf ball. She has had to change her clothing/sheets.  She had never had any therapy/intervention.  LMP 2024.  She did have an Eab in early April.    She does feel like she has vaginal odor since her procedure.       Gynecologic History  Patient's last menstrual period was 2024 (exact date).  Contraception: none  Last Pap: 2020. Results were: norm/neg  Last mammogram: NA. Results were: NA  Last Colonoscopy:  NA. Results were: NA  Last DEXA: NA. Results were: NA  Lipid/DM: with PCP    Obstetric History  OB History    Para Term  AB Living   5 2 1 1 3 2   SAB IAB Ectopic Multiple Live Births   0 2 1   2      # Outcome Date GA Lbr David/2nd Weight Sex Delivery Anes PTL Lv   5 IAB            4 Ectopic 2017     Medical Valentina      3 Term 2009     CS-LTranv   MADI   2   32w0d    CS-LTranv   MADI      Complications:  labor ([facility])   1 IAB                 Past Medical History  She has a past medical history of Essential (primary) hypertension (2013), Personal history of other endocrine, nutritional and metabolic disease (2022), and Personal history of other specified conditions.    Surgical History  She has a past surgical history that includes  section, low transverse; Ovarian cyst removal (); Umbilical hernia repair; and Gastric bypass (N/A).     Social History  She reports that she has never smoked. She has never used smokeless tobacco. She reports that she does not currently use alcohol. She reports that she does not use drugs.    Family History  Family History   Problem Relation Name Age of Onset    Breast cancer Mother  48    Hypertension Father   "        Allergies  Ibuprofen, Influen tr-split 2005 vac (pf), and Lisinopril     Chaperone: present  Physical Exam  Constitutional:       Appearance: Normal appearance.   Pulmonary:      Effort: Pulmonary effort is normal.   Chest:   Breasts:     Right: Normal. No mass, nipple discharge or skin change.      Left: Normal. No mass, nipple discharge or skin change.   Abdominal:      General: Abdomen is flat. There is no distension.      Palpations: Abdomen is soft.      Tenderness: There is no abdominal tenderness.   Genitourinary:     Labia:         Right: No rash, tenderness or lesion.         Left: No rash, tenderness or lesion.       Urethra: No prolapse.      Vagina: Vaginal discharge (thin, yellow/white) present. No bleeding.      Cervix: No friability or lesion.      Uterus: Normal. Enlarged (10 weeks size). Not tender.       Adnexa:         Right: No mass, tenderness or fullness.          Left: No mass, tenderness or fullness.     Neurological:      General: No focal deficit present.      Mental Status: She is alert.   Psychiatric:         Mood and Affect: Mood normal.          Last Recorded Vitals  Blood pressure (!) 192/117, height 1.676 m (5' 6\"), weight 101 kg (221 lb 12.8 oz), last menstrual period 02/19/2024.    Assessment/Plan   Problem List Items Addressed This Visit       Iron deficiency anemia    Relevant Orders    US PELVIS TRANSABDOMINAL WITH TRANSVAGINAL     Other Visit Diagnoses       Encounter for well woman exam with routine gynecological exam    -  Primary    Family history of malignant neoplasm of female breast        Relevant Orders    BI mammo bilateral screening tomosynthesis    Vaginal odor        Relevant Orders    Vaginitis Gram Stain For Bacterial Vaginosis + Yeast    Abnormal uterine bleeding (AUB)        Relevant Orders    US PELVIS TRANSABDOMINAL WITH TRANSVAGINAL            Contraception: none  PAP: due 6/2025    Mammogram: ordered   Colonoscopy:  NA   DEXA: NA   Lipid/DM: obtained " through PCP    Odor/discharge: check vaginitis panel  AUB: plan for pelvic ultrasound.    HTN-- repeat BP still 170/110. Pt asymptomatic. She did not take her meds this AM. Planning to go home and take amlodipine- has a BP cuff and will take her BP one hour after taking her meds. If still >160/110, will contact PCP.     Dennis Key MD

## 2024-04-25 LAB
CLUE CELLS VAG LPF-#/AREA: PRESENT /[LPF]
NUGENT SCORE: 10
YEAST VAG WET PREP-#/AREA: ABNORMAL

## 2024-04-26 DIAGNOSIS — F41.9 ANXIETY: ICD-10-CM

## 2024-04-26 DIAGNOSIS — N89.8 VAGINAL ODOR: Primary | ICD-10-CM

## 2024-04-26 RX ORDER — ESCITALOPRAM OXALATE 20 MG/1
20 TABLET ORAL DAILY
Qty: 90 TABLET | Refills: 0 | Status: SHIPPED | OUTPATIENT
Start: 2024-04-26

## 2024-04-26 RX ORDER — METRONIDAZOLE 500 MG/1
500 TABLET ORAL 2 TIMES DAILY
Qty: 14 TABLET | Refills: 0 | Status: SHIPPED | OUTPATIENT
Start: 2024-04-26 | End: 2024-05-03

## 2024-05-06 ENCOUNTER — APPOINTMENT (OUTPATIENT)
Dept: RADIOLOGY | Facility: CLINIC | Age: 39
End: 2024-05-06
Payer: COMMERCIAL

## 2024-05-08 ENCOUNTER — APPOINTMENT (OUTPATIENT)
Dept: PRIMARY CARE | Facility: CLINIC | Age: 39
End: 2024-05-08
Payer: COMMERCIAL

## 2024-05-13 ENCOUNTER — HOSPITAL ENCOUNTER (OUTPATIENT)
Dept: RADIOLOGY | Facility: CLINIC | Age: 39
End: 2024-05-13
Payer: COMMERCIAL

## 2024-05-20 ENCOUNTER — OFFICE VISIT (OUTPATIENT)
Dept: PRIMARY CARE | Facility: CLINIC | Age: 39
End: 2024-05-20
Payer: COMMERCIAL

## 2024-05-20 ENCOUNTER — LAB (OUTPATIENT)
Dept: LAB | Facility: LAB | Age: 39
End: 2024-05-20
Payer: COMMERCIAL

## 2024-05-20 VITALS — BODY MASS INDEX: 35.35 KG/M2 | WEIGHT: 219 LBS | SYSTOLIC BLOOD PRESSURE: 139 MMHG | DIASTOLIC BLOOD PRESSURE: 89 MMHG

## 2024-05-20 DIAGNOSIS — E55.9 VITAMIN D DEFICIENCY: ICD-10-CM

## 2024-05-20 DIAGNOSIS — D50.9 IRON DEFICIENCY ANEMIA, UNSPECIFIED IRON DEFICIENCY ANEMIA TYPE: ICD-10-CM

## 2024-05-20 DIAGNOSIS — I10 BENIGN HYPERTENSION: ICD-10-CM

## 2024-05-20 DIAGNOSIS — Z98.84 S/P BARIATRIC SURGERY: ICD-10-CM

## 2024-05-20 DIAGNOSIS — G47.00 INSOMNIA, UNSPECIFIED TYPE: ICD-10-CM

## 2024-05-20 DIAGNOSIS — I10 BENIGN HYPERTENSION: Primary | ICD-10-CM

## 2024-05-20 LAB
ALBUMIN SERPL BCP-MCNC: 4.3 G/DL (ref 3.4–5)
ALP SERPL-CCNC: 58 U/L (ref 33–110)
ALT SERPL W P-5'-P-CCNC: 10 U/L (ref 7–45)
ANION GAP SERPL CALC-SCNC: 14 MMOL/L (ref 10–20)
AST SERPL W P-5'-P-CCNC: 13 U/L (ref 9–39)
BILIRUB SERPL-MCNC: 0.3 MG/DL (ref 0–1.2)
BUN SERPL-MCNC: 14 MG/DL (ref 6–23)
CALCIUM SERPL-MCNC: 9.7 MG/DL (ref 8.6–10.6)
CHLORIDE SERPL-SCNC: 104 MMOL/L (ref 98–107)
CO2 SERPL-SCNC: 24 MMOL/L (ref 21–32)
CREAT SERPL-MCNC: 0.78 MG/DL (ref 0.5–1.05)
EGFRCR SERPLBLD CKD-EPI 2021: >90 ML/MIN/1.73M*2
ERYTHROCYTE [DISTWIDTH] IN BLOOD BY AUTOMATED COUNT: 21.3 % (ref 11.5–14.5)
GLUCOSE SERPL-MCNC: 95 MG/DL (ref 74–99)
HCT VFR BLD AUTO: 31.7 % (ref 36–46)
HCV AB SER QL: NONREACTIVE
HGB BLD-MCNC: 9.6 G/DL (ref 12–16)
MCH RBC QN AUTO: 21.7 PG (ref 26–34)
MCHC RBC AUTO-ENTMCNC: 30.3 G/DL (ref 32–36)
MCV RBC AUTO: 72 FL (ref 80–100)
NRBC BLD-RTO: 0 /100 WBCS (ref 0–0)
PLATELET # BLD AUTO: 351 X10*3/UL (ref 150–450)
POTASSIUM SERPL-SCNC: 3.4 MMOL/L (ref 3.5–5.3)
PROT SERPL-MCNC: 7.9 G/DL (ref 6.4–8.2)
RBC # BLD AUTO: 4.42 X10*6/UL (ref 4–5.2)
SODIUM SERPL-SCNC: 139 MMOL/L (ref 136–145)
WBC # BLD AUTO: 9.1 X10*3/UL (ref 4.4–11.3)

## 2024-05-20 PROCEDURE — 86803 HEPATITIS C AB TEST: CPT

## 2024-05-20 PROCEDURE — 85027 COMPLETE CBC AUTOMATED: CPT

## 2024-05-20 PROCEDURE — 36415 COLL VENOUS BLD VENIPUNCTURE: CPT

## 2024-05-20 PROCEDURE — 3075F SYST BP GE 130 - 139MM HG: CPT | Performed by: INTERNAL MEDICINE

## 2024-05-20 PROCEDURE — 99214 OFFICE O/P EST MOD 30 MIN: CPT | Performed by: INTERNAL MEDICINE

## 2024-05-20 PROCEDURE — 3079F DIAST BP 80-89 MM HG: CPT | Performed by: INTERNAL MEDICINE

## 2024-05-20 PROCEDURE — 80053 COMPREHEN METABOLIC PANEL: CPT

## 2024-05-20 RX ORDER — AMLODIPINE BESYLATE 10 MG/1
10 TABLET ORAL DAILY
Qty: 30 TABLET | Refills: 2 | Status: SHIPPED | OUTPATIENT
Start: 2024-05-20 | End: 2024-11-16

## 2024-05-20 ASSESSMENT — ENCOUNTER SYMPTOMS
GASTROINTESTINAL NEGATIVE: 1
SLEEP DISTURBANCE: 1
CONSTITUTIONAL NEGATIVE: 1
CARDIOVASCULAR NEGATIVE: 1
RESPIRATORY NEGATIVE: 1

## 2024-05-20 NOTE — PATIENT INSTRUCTIONS
Take Losartan in the morning  Take Amlodipine in the evening  Try to do 10-12 hrs in between these 2 medications  Valerian root capsules  Good for relaxation

## 2024-05-20 NOTE — PROGRESS NOTES
Chief Complaint:   Chief Complaint   Patient presents with    Follow-up      HPI    Malu Rios is a 38 y.o. year old female who presents to the clinic for follow up.  The patient is here for a follow up on chronic medical problems.  His medications were reviewed, pharmacy updated, notes reviewed, prior labs reviewed.      Past Medical History   Past Medical History:   Diagnosis Date    Essential (primary) hypertension 2013    Hypertension    Personal history of other endocrine, nutritional and metabolic disease 2022    History of morbid obesity    Personal history of other specified conditions     History of abdominal pain      Past Surgical History:   Past Surgical History:   Procedure Laterality Date     SECTION, LOW TRANSVERSE      x2    GASTRIC BYPASS N/A     gastric sleeve    OVARIAN CYST REMOVAL      with rght salpingectomy    UMBILICAL HERNIA REPAIR       Family History:   Family History   Problem Relation Name Age of Onset    Breast cancer Mother  48    Hypertension Father       Social History:   Tobacco Use: Low Risk  (2024)    Patient History     Smoking Tobacco Use: Never     Smokeless Tobacco Use: Never     Passive Exposure: Not on file      Social History     Substance and Sexual Activity   Alcohol Use Not Currently        Allergies:   Allergies   Allergen Reactions    Ibuprofen Unknown     Due to gastric sleeve surgery    Influen Tr-Split  Vac (Pf) Anaphylaxis    Lisinopril Anaphylaxis and Angioedema        ROS   Review of Systems   Constitutional: Negative.    Respiratory: Negative.     Cardiovascular: Negative.    Gastrointestinal: Negative.    Psychiatric/Behavioral:  Positive for sleep disturbance.         Objective   Vitals:    24 1507   BP: 139/89      BMI Readings from Last 15 Encounters:   24 35.35 kg/m²   24 35.80 kg/m²   24 34.54 kg/m²   23 35.99 kg/m²   22 35.19 kg/m²   22 35.35 kg/m²   10/27/21 35.02 kg/m²     "  99.3 kg (219 lb) (5/20/2024  3:07 PM)      Physical Exam  Physical Exam  Constitutional:       Appearance: She is well-developed.   Cardiovascular:      Rate and Rhythm: Normal rate and regular rhythm.      Heart sounds: Normal heart sounds. No murmur heard.  Pulmonary:      Effort: Pulmonary effort is normal.      Breath sounds: Normal breath sounds.   Abdominal:      General: Bowel sounds are normal.      Palpations: Abdomen is soft.          Labs:  CBC:  Recent Labs     01/23/24 1425 01/07/24  0430 04/28/23  1052   WBC 8.3 7.2 7.0   HGB 8.2* 7.3* 8.2*   HCT 28.9* 25.0* 28.7*    314 321   MCV 69* 67* 74*     CMP:  Recent Labs     01/23/24 1425 01/07/24  0430 04/28/23  1052    139 139   K 3.8 3.2* 3.9    105 105   CO2 23 23 28   ANIONGAP 17 14 10   BUN 14 11 11   CREATININE 0.87 0.68 0.80   EGFR 87 >90  --    GLUCOSE 77 92 83     Recent Labs     01/23/24  1425 01/07/24  0430 04/28/23  1052 06/17/22  1500 11/30/21  0500   ALBUMIN 4.4 4.1 4.0   < > 3.6   ALKPHOS 58 54 71   < > 56   ALT 9 11 9   < > 10   AST 11 19 12   < > 13   BILITOT 0.3 0.3 0.4   < > 0.2   LIPASE  --   --   --   --  22    < > = values in this interval not displayed.     Calcium/Phos:   Lab Results   Component Value Date    CALCIUM 10.3 01/23/2024      COAG: No results for input(s): \"INR\", \"DDIMERVTE\" in the last 07775 hours.  CRP: No results found for: \"CRP\"   [unfilled]   ENDO:  Recent Labs     01/23/24  1425 09/20/22  1058 10/27/21  1430 06/02/20  1230   TSH 1.37  --  1.78  --    HGBA1C 4.9 5.4 5.2 4.9      CARDIAC:   Recent Labs     01/07/24  0430   TROPHS 4     Recent Labs     01/23/24 1425 09/20/22  1058 10/27/21  1430   CHOL 148 143 150   LDLF  --  58  --    LDLCALC 74  --   --    HDL 60.3 52.0 63.1   TRIG 68 163*  --      No data recorded    Current Medications:  Current Outpatient Medications   Medication Sig Dispense Refill    escitalopram (Lexapro) 20 mg tablet TAKE 1 TABLET BY MOUTH EVERY DAY 90 tablet 0    " losartan-hydrochlorothiazide (Hyzaar) 100-12.5 mg tablet Take 1 tablet by mouth once daily. 30 tablet 11    amLODIPine (Norvasc) 10 mg tablet Take 1 tablet (10 mg) by mouth once daily. 30 tablet 2    cholecalciferol (Vitamin D-3) 50 mcg (2,000 unit) capsule Take 1 capsule (50 mcg) by mouth once daily.      ferrous sulfate 325 (65 Fe) MG tablet Take 1 tablet (65 mg of iron) by mouth once daily with a meal.      iron sucrose (Venofer) 200 mg iron/10 mL injection Infuse 10 mL (200 mg) over 5 minutes into a venous catheter 1 (one) time per week. 10 mL 1     No current facility-administered medications for this visit.       Assessment and Plan  Malu was seen today for follow-up.  Diagnoses and all orders for this visit:  Benign hypertension (Primary)  -     amLODIPine (Norvasc) 10 mg tablet; Take 1 tablet (10 mg) by mouth once daily.  -     CBC; Future  -     Comprehensive metabolic panel; Future  -     Hepatitis C antibody; Future  S/P bariatric surgery  Insomnia, unspecified type  -     amLODIPine (Norvasc) 10 mg tablet; Take 1 tablet (10 mg) by mouth once daily.  -     CBC; Future  -     Comprehensive metabolic panel; Future  -     Hepatitis C antibody; Future  Iron deficiency anemia, unspecified iron deficiency anemia type  -     amLODIPine (Norvasc) 10 mg tablet; Take 1 tablet (10 mg) by mouth once daily.  -     CBC; Future  -     Comprehensive metabolic panel; Future  -     Hepatitis C antibody; Future  Vitamin D deficiency     HTN  Not at the goal   Has had high numbers at home, but only during certain times of the day  She has been taking both meds at the same time  Recommend to spread them apart and continue close monitoring    Anxiety, stress related  Job, marriage in a week  She has poor sleep  Recommend natural Valerian root supplements  Avoid other medications at this time    Anemia  Was supposed to get iron infusions, but hasn't been contacted by anyone  Has been taking iron supplements orally  Recommend  repeat CBC today  If still low, we will resubmit for infusions      Immunizations:    There is no immunization history on file for this patient.  Please follow up in 3 months

## 2024-05-21 NOTE — RESULT ENCOUNTER NOTE
Good morning Malu,   Your blood work showed slightly low potassium level, please increase potassium in the diet, more bananas, baked potatoes with skin, orange juice, dried fruits  Your hemoglobin is better  Continue with iron supplements.  Best,  Dr. Trevizo

## 2024-05-29 ENCOUNTER — TELEPHONE (OUTPATIENT)
Dept: OBSTETRICS AND GYNECOLOGY | Facility: CLINIC | Age: 39
End: 2024-05-29
Payer: COMMERCIAL

## 2024-05-29 NOTE — TELEPHONE ENCOUNTER
Called patient regarding refill request for flagyl  Left  for patient to return call    CHELA RangelN RN

## 2024-06-05 ENCOUNTER — APPOINTMENT (OUTPATIENT)
Dept: RADIOLOGY | Facility: CLINIC | Age: 39
End: 2024-06-05
Payer: COMMERCIAL

## 2024-06-13 DIAGNOSIS — D50.9 IRON DEFICIENCY ANEMIA, UNSPECIFIED IRON DEFICIENCY ANEMIA TYPE: ICD-10-CM

## 2024-06-13 DIAGNOSIS — G47.00 INSOMNIA, UNSPECIFIED TYPE: ICD-10-CM

## 2024-06-13 DIAGNOSIS — I10 BENIGN HYPERTENSION: ICD-10-CM

## 2024-06-13 RX ORDER — AMLODIPINE BESYLATE 10 MG/1
10 TABLET ORAL DAILY
Qty: 90 TABLET | Refills: 0 | Status: SHIPPED | OUTPATIENT
Start: 2024-06-13

## 2024-06-21 ENCOUNTER — APPOINTMENT (OUTPATIENT)
Dept: RADIOLOGY | Facility: HOSPITAL | Age: 39
End: 2024-06-21
Payer: COMMERCIAL

## 2024-06-21 ENCOUNTER — APPOINTMENT (OUTPATIENT)
Dept: RADIOLOGY | Facility: CLINIC | Age: 39
End: 2024-06-21
Payer: COMMERCIAL

## 2024-07-21 DIAGNOSIS — F41.9 ANXIETY: ICD-10-CM

## 2024-07-22 RX ORDER — ESCITALOPRAM OXALATE 20 MG/1
20 TABLET ORAL DAILY
Qty: 90 TABLET | Refills: 0 | Status: SHIPPED | OUTPATIENT
Start: 2024-07-22

## 2024-08-20 ENCOUNTER — APPOINTMENT (OUTPATIENT)
Dept: PRIMARY CARE | Facility: CLINIC | Age: 39
End: 2024-08-20
Payer: COMMERCIAL

## 2024-09-07 DIAGNOSIS — I10 BENIGN HYPERTENSION: ICD-10-CM

## 2024-09-08 RX ORDER — AMLODIPINE BESYLATE 10 MG/1
10 TABLET ORAL DAILY
Qty: 90 TABLET | Refills: 0 | Status: SHIPPED | OUTPATIENT
Start: 2024-09-08

## 2024-10-11 ENCOUNTER — APPOINTMENT (OUTPATIENT)
Dept: PRIMARY CARE | Facility: CLINIC | Age: 39
End: 2024-10-11
Payer: COMMERCIAL

## 2024-10-18 ENCOUNTER — LAB (OUTPATIENT)
Dept: LAB | Facility: LAB | Age: 39
End: 2024-10-18
Payer: COMMERCIAL

## 2024-10-18 ENCOUNTER — APPOINTMENT (OUTPATIENT)
Dept: PRIMARY CARE | Facility: CLINIC | Age: 39
End: 2024-10-18
Payer: COMMERCIAL

## 2024-10-18 VITALS
WEIGHT: 206 LBS | HEART RATE: 82 BPM | BODY MASS INDEX: 33.11 KG/M2 | DIASTOLIC BLOOD PRESSURE: 107 MMHG | HEIGHT: 66 IN | SYSTOLIC BLOOD PRESSURE: 158 MMHG

## 2024-10-18 DIAGNOSIS — I10 BENIGN HYPERTENSION: Primary | ICD-10-CM

## 2024-10-18 DIAGNOSIS — F41.9 ANXIETY: ICD-10-CM

## 2024-10-18 DIAGNOSIS — D50.9 IRON DEFICIENCY ANEMIA, UNSPECIFIED IRON DEFICIENCY ANEMIA TYPE: ICD-10-CM

## 2024-10-18 DIAGNOSIS — I10 BENIGN HYPERTENSION: ICD-10-CM

## 2024-10-18 LAB
ALBUMIN SERPL BCP-MCNC: 4.6 G/DL (ref 3.4–5)
ALP SERPL-CCNC: 79 U/L (ref 33–110)
ALT SERPL W P-5'-P-CCNC: 30 U/L (ref 7–45)
ANION GAP SERPL CALC-SCNC: 15 MMOL/L (ref 10–20)
AST SERPL W P-5'-P-CCNC: 24 U/L (ref 9–39)
BASOPHILS # BLD AUTO: 0.05 X10*3/UL (ref 0–0.1)
BASOPHILS NFR BLD AUTO: 0.5 %
BILIRUB SERPL-MCNC: 0.3 MG/DL (ref 0–1.2)
BUN SERPL-MCNC: 34 MG/DL (ref 6–23)
CALCIUM SERPL-MCNC: 9.8 MG/DL (ref 8.6–10.6)
CHLORIDE SERPL-SCNC: 109 MMOL/L (ref 98–107)
CO2 SERPL-SCNC: 21 MMOL/L (ref 21–32)
CREAT SERPL-MCNC: 1.19 MG/DL (ref 0.5–1.05)
EGFRCR SERPLBLD CKD-EPI 2021: 59 ML/MIN/1.73M*2
EOSINOPHIL # BLD AUTO: 0.07 X10*3/UL (ref 0–0.7)
EOSINOPHIL NFR BLD AUTO: 0.6 %
ERYTHROCYTE [DISTWIDTH] IN BLOOD BY AUTOMATED COUNT: 17.1 % (ref 11.5–14.5)
GLUCOSE SERPL-MCNC: 94 MG/DL (ref 74–99)
HCT VFR BLD AUTO: 33.7 % (ref 36–46)
HGB BLD-MCNC: 10 G/DL (ref 12–16)
IMM GRANULOCYTES # BLD AUTO: 0.03 X10*3/UL (ref 0–0.7)
IMM GRANULOCYTES NFR BLD AUTO: 0.3 % (ref 0–0.9)
LYMPHOCYTES # BLD AUTO: 2.69 X10*3/UL (ref 1.2–4.8)
LYMPHOCYTES NFR BLD AUTO: 24.9 %
MCH RBC QN AUTO: 22.2 PG (ref 26–34)
MCHC RBC AUTO-ENTMCNC: 29.7 G/DL (ref 32–36)
MCV RBC AUTO: 75 FL (ref 80–100)
MONOCYTES # BLD AUTO: 0.81 X10*3/UL (ref 0.1–1)
MONOCYTES NFR BLD AUTO: 7.5 %
NEUTROPHILS # BLD AUTO: 7.17 X10*3/UL (ref 1.2–7.7)
NEUTROPHILS NFR BLD AUTO: 66.2 %
NRBC BLD-RTO: 0 /100 WBCS (ref 0–0)
PLATELET # BLD AUTO: 269 X10*3/UL (ref 150–450)
POTASSIUM SERPL-SCNC: 3.5 MMOL/L (ref 3.5–5.3)
PROT SERPL-MCNC: 8.6 G/DL (ref 6.4–8.2)
RBC # BLD AUTO: 4.51 X10*6/UL (ref 4–5.2)
SODIUM SERPL-SCNC: 141 MMOL/L (ref 136–145)
WBC # BLD AUTO: 10.8 X10*3/UL (ref 4.4–11.3)

## 2024-10-18 PROCEDURE — 82570 ASSAY OF URINE CREATININE: CPT

## 2024-10-18 PROCEDURE — 3077F SYST BP >= 140 MM HG: CPT | Performed by: INTERNAL MEDICINE

## 2024-10-18 PROCEDURE — 80053 COMPREHEN METABOLIC PANEL: CPT

## 2024-10-18 PROCEDURE — 3008F BODY MASS INDEX DOCD: CPT | Performed by: INTERNAL MEDICINE

## 2024-10-18 PROCEDURE — 85025 COMPLETE CBC W/AUTO DIFF WBC: CPT

## 2024-10-18 PROCEDURE — 36415 COLL VENOUS BLD VENIPUNCTURE: CPT

## 2024-10-18 PROCEDURE — 1036F TOBACCO NON-USER: CPT | Performed by: INTERNAL MEDICINE

## 2024-10-18 PROCEDURE — 3080F DIAST BP >= 90 MM HG: CPT | Performed by: INTERNAL MEDICINE

## 2024-10-18 PROCEDURE — 99214 OFFICE O/P EST MOD 30 MIN: CPT | Performed by: INTERNAL MEDICINE

## 2024-10-18 PROCEDURE — 82043 UR ALBUMIN QUANTITATIVE: CPT

## 2024-10-18 RX ORDER — AMLODIPINE BESYLATE 10 MG/1
10 TABLET ORAL DAILY
Qty: 90 TABLET | Refills: 1 | Status: SHIPPED | OUTPATIENT
Start: 2024-10-18

## 2024-10-18 RX ORDER — ESCITALOPRAM OXALATE 20 MG/1
20 TABLET ORAL DAILY
Qty: 90 TABLET | Refills: 1 | Status: SHIPPED | OUTPATIENT
Start: 2024-10-18

## 2024-10-18 RX ORDER — LOSARTAN POTASSIUM AND HYDROCHLOROTHIAZIDE 12.5; 1 MG/1; MG/1
1 TABLET ORAL DAILY
Qty: 90 TABLET | Refills: 1 | Status: SHIPPED | OUTPATIENT
Start: 2024-10-18 | End: 2025-04-16

## 2024-10-18 ASSESSMENT — ENCOUNTER SYMPTOMS
WEAKNESS: 0
ACTIVITY CHANGE: 0
CONFUSION: 0
AGITATION: 0
LIGHT-HEADEDNESS: 0
ABDOMINAL DISTENTION: 0
VOMITING: 0
DIARRHEA: 0
SHORTNESS OF BREATH: 0
FATIGUE: 0
NAUSEA: 0
FEVER: 0
ABDOMINAL PAIN: 0
DIZZINESS: 0
COUGH: 0
WHEEZING: 0
PALPITATIONS: 0

## 2024-10-18 ASSESSMENT — PATIENT HEALTH QUESTIONNAIRE - PHQ9
1. LITTLE INTEREST OR PLEASURE IN DOING THINGS: NOT AT ALL
SUM OF ALL RESPONSES TO PHQ9 QUESTIONS 1 AND 2: 0
2. FEELING DOWN, DEPRESSED OR HOPELESS: NOT AT ALL

## 2024-10-18 ASSESSMENT — LIFESTYLE VARIABLES
HOW OFTEN DO YOU HAVE SIX OR MORE DRINKS ON ONE OCCASION: NEVER
HOW MANY STANDARD DRINKS CONTAINING ALCOHOL DO YOU HAVE ON A TYPICAL DAY: PATIENT DOES NOT DRINK
HOW OFTEN DO YOU HAVE A DRINK CONTAINING ALCOHOL: NEVER
SKIP TO QUESTIONS 9-10: 1
AUDIT-C TOTAL SCORE: 0

## 2024-10-18 NOTE — LETTER
Malu Rios was seen in our office today 10/18/24. She has been sick for the past few days since 10/14/24. Please excuse her from work and allow her to return on 10/21/24.     Feel free to contact me with any questions.     Sincerely,     Dr. Reva Simmons, DO

## 2024-10-18 NOTE — PROGRESS NOTES
I reviewed the resident/fellow's documentation and discussed the patient with the resident/fellow. I agree with the resident/fellow's medical decision making as documented in the note.  As the attending physician,  I reviewed the relevant imaging studies and available reports. I also discussed the differential diagnosis and all of the proposed management plans with the resident.    Joseline Remy MD

## 2024-10-18 NOTE — ASSESSMENT & PLAN NOTE
- /107 today in office but pt has been sick and not on BP meds, was well controlled 114/78 at home   - Continue losartan hydrochlorothiazide 100/12.5mg daily   - Continue amlodipine 10mg daily   - Check CMP + urine albumin

## 2024-10-18 NOTE — ASSESSMENT & PLAN NOTE
- Pt has not been contacted about scheduling iron infusions   - Recheck CBC today, if Hgb > 8.0 will hold off on re-submitting for infusions as likely would not qualify   - Continue oral iron daily

## 2024-10-18 NOTE — PROGRESS NOTES
Subjective   Malu Rios is a 39 y.o. female with PMH of HTN, Iron Deficiency Anemia, Depression, and Vitamin D Deficiency who presents for Follow-up (Refills / allergic to flu shot).    Patient states that she has been sick for about the last week. She started throwing up on Saturday and thinks she may have caught a stomach virus. She has not been taking any of her meds for the past few days. When she was taking her BP meds and checking BP, numbers were usually 114/78.         Review of Systems   Constitutional:  Negative for activity change, fatigue and fever.   Respiratory:  Negative for cough, shortness of breath and wheezing.    Cardiovascular:  Negative for chest pain, palpitations and leg swelling.   Gastrointestinal:  Negative for abdominal distention, abdominal pain, diarrhea, nausea and vomiting.   Neurological:  Negative for dizziness, weakness and light-headedness.   Psychiatric/Behavioral:  Negative for agitation and confusion.        Objective   Physical Exam  Constitutional:       General: She is not in acute distress.     Appearance: Normal appearance.   Cardiovascular:      Rate and Rhythm: Normal rate and regular rhythm.      Heart sounds: Normal heart sounds. No murmur heard.     No friction rub. No gallop.   Pulmonary:      Effort: Pulmonary effort is normal. No respiratory distress.      Breath sounds: Normal breath sounds. No wheezing, rhonchi or rales.   Abdominal:      General: Abdomen is flat. Bowel sounds are normal. There is no distension.      Palpations: Abdomen is soft.      Tenderness: There is no abdominal tenderness.   Musculoskeletal:         General: No swelling or tenderness. Normal range of motion.   Skin:     General: Skin is warm and dry.      Findings: No erythema or rash.   Neurological:      General: No focal deficit present.      Mental Status: She is alert and oriented to person, place, and time. Mental status is at baseline.   Psychiatric:         Mood and Affect:  Mood normal.         Behavior: Behavior normal.         Assessment/Plan   Problem List Items Addressed This Visit       Anxiety    Relevant Medications    escitalopram (Lexapro) 20 mg tablet    Benign hypertension - Primary     - /107 today in office but pt has been sick and not on BP meds, was well controlled 114/78 at home   - Continue losartan hydrochlorothiazide 100/12.5mg daily   - Continue amlodipine 10mg daily   - Check CMP + urine albumin          Relevant Medications    losartan-hydrochlorothiazide (Hyzaar) 100-12.5 mg tablet    amLODIPine (Norvasc) 10 mg tablet    Other Relevant Orders    Albumin-Creatinine Ratio, Urine Random    Comprehensive metabolic panel    Lipid panel    Iron deficiency anemia     - Pt has not been contacted about scheduling iron infusions   - Recheck CBC today, if Hgb > 8.0 will hold off on re-submitting for infusions as likely would not qualify   - Continue oral iron daily          Relevant Orders    CBC and Auto Differential            # Health Maintenance   - Mammogram ordered not yet scheduled   - Follows with OB/GYN   - Labs: CMP, CBC, lipids, urine albumin ordered   - Vaccines: Declined flu shot

## 2024-10-19 LAB
CREAT UR-MCNC: 160.1 MG/DL (ref 20–320)
MICROALBUMIN UR-MCNC: 77.1 MG/L
MICROALBUMIN/CREAT UR: 48.2 UG/MG CREAT

## 2024-10-19 NOTE — RESULT ENCOUNTER NOTE
Please start drinking more water, you are slightly dehydrated.  Also, continue with iron supplements.  Best,  Dr. Trevizo

## 2025-02-07 ENCOUNTER — APPOINTMENT (OUTPATIENT)
Dept: PRIMARY CARE | Facility: CLINIC | Age: 40
End: 2025-02-07
Payer: COMMERCIAL

## 2025-02-17 ENCOUNTER — APPOINTMENT (OUTPATIENT)
Dept: PRIMARY CARE | Facility: CLINIC | Age: 40
End: 2025-02-17
Payer: COMMERCIAL

## 2025-02-24 ENCOUNTER — APPOINTMENT (OUTPATIENT)
Dept: PRIMARY CARE | Facility: CLINIC | Age: 40
End: 2025-02-24
Payer: COMMERCIAL

## 2025-03-10 ENCOUNTER — APPOINTMENT (OUTPATIENT)
Dept: PRIMARY CARE | Facility: CLINIC | Age: 40
End: 2025-03-10
Payer: COMMERCIAL

## 2025-06-08 DIAGNOSIS — I10 BENIGN HYPERTENSION: ICD-10-CM

## 2025-06-09 RX ORDER — LOSARTAN POTASSIUM AND HYDROCHLOROTHIAZIDE 12.5; 1 MG/1; MG/1
1 TABLET ORAL DAILY
Qty: 90 TABLET | Refills: 1 | Status: SHIPPED | OUTPATIENT
Start: 2025-06-09

## 2025-06-09 RX ORDER — AMLODIPINE BESYLATE 10 MG/1
10 TABLET ORAL DAILY
Qty: 90 TABLET | Refills: 1 | Status: SHIPPED | OUTPATIENT
Start: 2025-06-09

## 2025-06-11 ENCOUNTER — APPOINTMENT (OUTPATIENT)
Dept: PRIMARY CARE | Facility: CLINIC | Age: 40
End: 2025-06-11
Payer: COMMERCIAL

## 2025-06-11 VITALS
DIASTOLIC BLOOD PRESSURE: 92 MMHG | BODY MASS INDEX: 35.84 KG/M2 | WEIGHT: 223 LBS | HEIGHT: 66 IN | HEART RATE: 89 BPM | SYSTOLIC BLOOD PRESSURE: 148 MMHG

## 2025-06-11 DIAGNOSIS — Z98.84 S/P BARIATRIC SURGERY: Primary | ICD-10-CM

## 2025-06-11 DIAGNOSIS — E66.812 CLASS 2 SEVERE OBESITY WITH SERIOUS COMORBIDITY AND BODY MASS INDEX (BMI) OF 35.0 TO 35.9 IN ADULT, UNSPECIFIED OBESITY TYPE: ICD-10-CM

## 2025-06-11 DIAGNOSIS — I10 BENIGN HYPERTENSION: ICD-10-CM

## 2025-06-11 DIAGNOSIS — E55.9 VITAMIN D DEFICIENCY: ICD-10-CM

## 2025-06-11 DIAGNOSIS — Z00.00 ANNUAL PHYSICAL EXAM: ICD-10-CM

## 2025-06-11 DIAGNOSIS — E66.01 CLASS 2 SEVERE OBESITY WITH SERIOUS COMORBIDITY AND BODY MASS INDEX (BMI) OF 35.0 TO 35.9 IN ADULT, UNSPECIFIED OBESITY TYPE: ICD-10-CM

## 2025-06-11 DIAGNOSIS — F41.9 ANXIETY: ICD-10-CM

## 2025-06-11 DIAGNOSIS — D50.9 IRON DEFICIENCY ANEMIA, UNSPECIFIED IRON DEFICIENCY ANEMIA TYPE: ICD-10-CM

## 2025-06-11 PROCEDURE — 99214 OFFICE O/P EST MOD 30 MIN: CPT | Performed by: INTERNAL MEDICINE

## 2025-06-11 PROCEDURE — 3008F BODY MASS INDEX DOCD: CPT | Performed by: INTERNAL MEDICINE

## 2025-06-11 PROCEDURE — 1036F TOBACCO NON-USER: CPT | Performed by: INTERNAL MEDICINE

## 2025-06-11 PROCEDURE — 3080F DIAST BP >= 90 MM HG: CPT | Performed by: INTERNAL MEDICINE

## 2025-06-11 PROCEDURE — 3077F SYST BP >= 140 MM HG: CPT | Performed by: INTERNAL MEDICINE

## 2025-06-11 ASSESSMENT — LIFESTYLE VARIABLES
HOW MANY STANDARD DRINKS CONTAINING ALCOHOL DO YOU HAVE ON A TYPICAL DAY: PATIENT DOES NOT DRINK
SKIP TO QUESTIONS 9-10: 1
HOW OFTEN DO YOU HAVE A DRINK CONTAINING ALCOHOL: NEVER
HOW OFTEN DO YOU HAVE SIX OR MORE DRINKS ON ONE OCCASION: NEVER
AUDIT-C TOTAL SCORE: 0

## 2025-06-11 ASSESSMENT — ENCOUNTER SYMPTOMS
RESPIRATORY NEGATIVE: 1
CARDIOVASCULAR NEGATIVE: 1
CONSTITUTIONAL NEGATIVE: 1
GASTROINTESTINAL NEGATIVE: 1

## 2025-06-11 NOTE — PROGRESS NOTES
Chief Complaint:   Chief Complaint   Patient presents with    Follow-up    Obesity        Malu Rios is a 39 y.o. year old female is here for follow-up.   HPI   Malu Rios is here for follow up on chronic conditions.  Reports issues with her weight.  S/p bariatric surgery in 2019  Post surgery recovery weight 180  Slowly started gaining weight  Got worse with COVID  And now worse with return to work and inability to be more active.  Has a history of anxiety and depression  On Lexapro  Seeing a therapist, who recommended to talk about weight loss meds.      Compliant with medications.  Denies side effects.  Needs refills on medications.  Chart reviewed, pharmacy updated, prior notes, labs, imaging, EKGs reviewed.    Past Medical History   Medical History[1]   Past Surgical History:   Surgical History[2]  Family History:   Family History[3]  Social History:   Tobacco Use: Low Risk  (6/11/2025)    Patient History     Smoking Tobacco Use: Never     Smokeless Tobacco Use: Never     Passive Exposure: Past      Social History     Substance and Sexual Activity   Alcohol Use Not Currently        Allergies:   RX Allergies[4]     ROS   Review of Systems   Constitutional: Negative.    Respiratory: Negative.     Cardiovascular: Negative.    Gastrointestinal: Negative.         Objective   Vitals:    06/11/25 1429   BP: (!) 148/92   Pulse: 89      BMI Readings from Last 15 Encounters:   06/11/25 35.99 kg/m²   10/18/24 33.25 kg/m²   05/20/24 35.35 kg/m²   04/24/24 35.80 kg/m²   01/23/24 34.54 kg/m²   01/13/23 35.99 kg/m²   06/17/22 35.19 kg/m²   03/18/22 35.35 kg/m²   10/27/21 35.02 kg/m²      101 kg (223 lb) (6/11/2025  2:29 PM)      Physical Exam  Physical Exam  Constitutional:       Appearance: Normal appearance. She is well-developed.   HENT:      Head: Normocephalic and atraumatic.   Cardiovascular:      Rate and Rhythm: Normal rate and regular rhythm.      Heart sounds: Normal heart sounds.   Pulmonary:       "Effort: Pulmonary effort is normal.      Breath sounds: Normal breath sounds.   Abdominal:      General: Bowel sounds are normal.      Palpations: Abdomen is soft.   Musculoskeletal:         General: Normal range of motion.      Cervical back: Normal range of motion and neck supple.   Skin:     General: Skin is warm and dry.   Neurological:      General: No focal deficit present.      Mental Status: She is alert and oriented to person, place, and time.   Psychiatric:         Mood and Affect: Mood normal.         Behavior: Behavior normal.        Labs:  CBC:  Recent Labs     10/18/24  1425 05/20/24  1525 01/23/24  1425   WBC 10.8 9.1 8.3   HGB 10.0* 9.6* 8.2*   HCT 33.7* 31.7* 28.9*    351 385   MCV 75* 72* 69*     CMP:  Recent Labs     10/18/24  1425 05/20/24  1525 01/23/24  1425    139 139   K 3.5 3.4* 3.8   * 104 103   CO2 21 24 23   ANIONGAP 15 14 17   BUN 34* 14 14   CREATININE 1.19* 0.78 0.87   EGFR 59* >90 87   GLUCOSE 94 95 77     Recent Labs     10/18/24  1425 05/20/24  1525 01/23/24  1425 06/17/22  1500 11/30/21  0500   ALBUMIN 4.6 4.3 4.4   < > 3.6   ALKPHOS 79 58 58   < > 56   ALT 30 10 9   < > 10   AST 24 13 11   < > 13   BILITOT 0.3 0.3 0.3   < > 0.2   LIPASE  --   --   --   --  22    < > = values in this interval not displayed.     Calcium/Phos:   Lab Results   Component Value Date    CALCIUM 9.8 10/18/2024      COAG: No results for input(s): \"INR\", \"DDIMERVTE\" in the last 98753 hours.  CRP: No results found for: \"CRP\"   [unfilled]   ENDO:  Recent Labs     01/23/24  1425 09/20/22  1058 10/27/21  1430 06/02/20  1230   TSH 1.37  --  1.78  --    HGBA1C 4.9 5.4 5.2 4.9      CARDIAC:   Recent Labs     01/07/24  0430   TROPHS 4     Recent Labs     01/23/24  1425 09/20/22  1058 10/27/21  1430   CHOL 148 143 150   LDLF  --  58  --    LDLCALC 74  --   --    HDL 60.3 52.0 63.1   TRIG 68 163*  --      No data recorded    Current Medications:  Current Medications[5]    Assessment and " Plan  Malu was seen today for follow-up and obesity.  Diagnoses and all orders for this visit:  S/P bariatric surgery (Primary)  -     Referral to Clinical Pharmacy; Future  -     Referral to Nutrition Services; Future  -     CBC; Future  -     Comprehensive Metabolic Panel; Future  -     TSH with reflex to Free T4 if abnormal; Future  -     Hemoglobin A1C; Future  -     Lipid Panel; Future  -     Vitamin D 25-Hydroxy,Total (for eval of Vitamin D levels); Future  -     Albumin-Creatinine Ratio, Urine Random; Future  -     Ferritin; Future  -     CBC  -     Comprehensive Metabolic Panel  -     TSH with reflex to Free T4 if abnormal  -     Hemoglobin A1C  -     Lipid Panel  -     Vitamin D 25-Hydroxy,Total (for eval of Vitamin D levels)  -     Albumin-Creatinine Ratio, Urine Random  -     Ferritin  Anxiety  -     Referral to Clinical Pharmacy; Future  -     Referral to Nutrition Services; Future  -     CBC; Future  -     Comprehensive Metabolic Panel; Future  -     TSH with reflex to Free T4 if abnormal; Future  -     Hemoglobin A1C; Future  -     Lipid Panel; Future  -     Vitamin D 25-Hydroxy,Total (for eval of Vitamin D levels); Future  -     Albumin-Creatinine Ratio, Urine Random; Future  -     Ferritin; Future  -     CBC  -     Comprehensive Metabolic Panel  -     TSH with reflex to Free T4 if abnormal  -     Hemoglobin A1C  -     Lipid Panel  -     Vitamin D 25-Hydroxy,Total (for eval of Vitamin D levels)  -     Albumin-Creatinine Ratio, Urine Random  -     Ferritin  Class 2 severe obesity with serious comorbidity and body mass index (BMI) of 35.0 to 35.9 in adult, unspecified obesity type  -     Referral to Clinical Pharmacy; Future  -     Referral to Nutrition Services; Future  -     CBC; Future  -     Comprehensive Metabolic Panel; Future  -     TSH with reflex to Free T4 if abnormal; Future  -     Hemoglobin A1C; Future  -     Lipid Panel; Future  -     Vitamin D 25-Hydroxy,Total (for eval of Vitamin D  levels); Future  -     Albumin-Creatinine Ratio, Urine Random; Future  -     Ferritin; Future  -     CBC  -     Comprehensive Metabolic Panel  -     TSH with reflex to Free T4 if abnormal  -     Hemoglobin A1C  -     Lipid Panel  -     Vitamin D 25-Hydroxy,Total (for eval of Vitamin D levels)  -     Albumin-Creatinine Ratio, Urine Random  -     Ferritin  Benign hypertension  -     CBC; Future  -     Comprehensive Metabolic Panel; Future  -     TSH with reflex to Free T4 if abnormal; Future  -     Hemoglobin A1C; Future  -     Lipid Panel; Future  -     Vitamin D 25-Hydroxy,Total (for eval of Vitamin D levels); Future  -     Albumin-Creatinine Ratio, Urine Random; Future  -     Ferritin; Future  -     CBC  -     Comprehensive Metabolic Panel  -     TSH with reflex to Free T4 if abnormal  -     Hemoglobin A1C  -     Lipid Panel  -     Vitamin D 25-Hydroxy,Total (for eval of Vitamin D levels)  -     Albumin-Creatinine Ratio, Urine Random  -     Ferritin  Iron deficiency anemia, unspecified iron deficiency anemia type  -     CBC; Future  -     Comprehensive Metabolic Panel; Future  -     TSH with reflex to Free T4 if abnormal; Future  -     Hemoglobin A1C; Future  -     Lipid Panel; Future  -     Vitamin D 25-Hydroxy,Total (for eval of Vitamin D levels); Future  -     Albumin-Creatinine Ratio, Urine Random; Future  -     Ferritin; Future  -     CBC  -     Comprehensive Metabolic Panel  -     TSH with reflex to Free T4 if abnormal  -     Hemoglobin A1C  -     Lipid Panel  -     Vitamin D 25-Hydroxy,Total (for eval of Vitamin D levels)  -     Albumin-Creatinine Ratio, Urine Random  -     Ferritin  Vitamin D deficiency  -     CBC; Future  -     Comprehensive Metabolic Panel; Future  -     TSH with reflex to Free T4 if abnormal; Future  -     Hemoglobin A1C; Future  -     Lipid Panel; Future  -     Vitamin D 25-Hydroxy,Total (for eval of Vitamin D levels); Future  -     Albumin-Creatinine Ratio, Urine Random; Future  -      "Ferritin; Future  -     CBC  -     Comprehensive Metabolic Panel  -     TSH with reflex to Free T4 if abnormal  -     Hemoglobin A1C  -     Lipid Panel  -     Vitamin D 25-Hydroxy,Total (for eval of Vitamin D levels)  -     Albumin-Creatinine Ratio, Urine Random  -     Ferritin  Annual physical exam  -     BI mammo bilateral screening tomosynthesis; Future     Weight gain after a  successful bypass surgery     Clinical pharmacy referral placed  Nutritionist referral placed  BW ordered       Diet Recommendations:  Recommend tracking daily nutrition intake through a tracking lisa to monitor portions, meal patters, snacks and ensure meeting but not exceeding calorie recommendation and that you are meeting protein goals to help support maintaining lean body mass.  - Some apps you can use are Merlin, Lose It, Carb Manager, etc.  - Calorie reduction is important because if we are consuming more calories than we are burning we cannot lose weight.   - It is imperative though that we also focus on IMPROVING THE QUALITY of calories while reducing them to better manage appetite.     Recommend initiating a reduced calorie diet plan:  Do not sacrifice protein to reduce calories, maintain protein intake at goal 1 g protein per kg of body weight:     Please limit processed foods in the diet as they are known to contribute to obesity and weight gain. Processed foods include food items like breads, pastas, flour, bakery, chips, snack crackers, cereals, cookies, etc. These foods will typically come from \"bag, boxes with barcodes\" from the middle aisles of the grocery store - they contain added sugars and refined flours, are low in fiber and may stimulate appetite rather than help manage it effectively.      Increasing your intake of HIGH FIBER carbohydrates from WHOLE FOOD sources helps increase fullness and appetite regulation with meals and between meals. High fiber foods are non starchy vegetables, fruit, beans, lentils, " "nuts/seeds, etc.      Incorporating high quality protein and a heart healthy source of dietary fat at all meals is also effective to help craft meals to better regular hunger, energy levels and support weight loss.      WHAT WILL MY PLATE LOOK LIKE IF I EAT LIKE THIS?.    - Aim for MINIMUM 25-30g of protein at meals: approx 4 oz of high protein food like chicken, fish, turkey, beef, pork, 2-3 eggs, 1/2-3/4 cup cottage cheese or plain greek yogurt. At each meal EAT PROTEIN FIRST! This sets the stage to help better regulate your appetite during and between meals.     - The remainder of the plate should include unprocessed (remember ingredient list ONE!) carbohydrates:   Aim for 2-3 cups of non starchy vegetables (all veggies except potatoes, peas, corn)   OR 1-2 cups non starchy vegetables AND 1/2 cup complex high fiber carbohydrate like lentils, beans, whole grain food (quinoa, rice, etc)   OR 1-2 cups of non starchy vegetables and 1 serving of fruit (lower sugar fruits encouraged such as 1/2 cup berries - blue/black/strawberries, or small pieces of citrus fruit or apple - think tennis ball or smaller in size)     Fat is your \"Plate Topper\" - Fat is calorie dense so it is important to monitor the portion, but fat is also very satiating - helps regulate appetite and keep you full between meals.  - Include 1 serving of heart healthy fat at meals: 1-2 tbsp olive/avocado/coconut oil (can be used to cook or dress vegetables), 1-2 tbsp unsweetened nut butter (peanut, almond, etc), 5-6 olives, 1/3 avocado, 1/2-1 oz nuts/seeds likes walnuts, almonds, pecans, brazil nuts, etc.      - In many cases eating in the above fashion will help you avoid snacking between meals because appetite will be more balanced/regulated. If you do need a snack, I recommend a high protein snack to better manage hunger/appetite.      - AVOID \"NAKED CARBS\" as carbohydrates eaten alone can increase blood sugar and insulin levels, resulting in " increased hunger after snack. If you consume a carbohydrate with a snack, pair it with protein or a healthy source of fat (see examples below):     - Some examples of snack which would fall in line with this recommendation include: A string cheese and 1 oz of nuts, 1/2 cup cottage cheese mixed with 1 TBSP nut butter, 1/2 cup plain or zero added sugar greek yogurt mixed with 1/2-1oz almonds, 2 hardboiled eggs and 100 calorie pack of olives, apple and 1-2 tbsp of peanut or almond butter, 2 clementines and 1/4 cup of nuts.     Please avoid all added sugar beverages in favor of water only (preferably - unsweetened herbal tea and seltzer salvador are also calorie free options).        Exercise Recommendations:   Recommend initiating a regular exercise program to enhance weight loss, maintain lean body mass.   End goal is to reach 200+ minutes of moderate intense aerobic exercise on a weekly basis (approx 45-60 minutes 4-5 days per week) as studies show this level of exercise helps to promote long term healthy weight.      A great place to start is adding in 10-20 minutes of brisk walking after each meal or after lunch or dinner.     Web based resources for meal planning:  www.Triplify.Metaconomy - I do recommend reviewing the meal plan resources specifically following LOWER CARBOHYDRATE MEDITERRANEAN diet approaches. These meal plans offer great suggestions for focusing on REAL FOOD (Remember ingredient list ONE foods focus), balanced meals emphasizing high quality protein, heart healthy fats and a variety of high fiber carbohydrate sources to support weight loss and health.   By optimizing your health through good nutrition, daily exercise, stress management, low/moderate alcohol and avoidance of tobacco, sugar and processed foods, you can help to decrease your risk of cardiovascular disease and stroke and achieve a healthy weight. A diet rich in whole, plant-based foods such as vegetables, beans, seeds, nuts, whole grains,  healthy fats and fruit - leads to lower body mass index, blood pressure, HbA1C, and cholesterol levels.         Immunizations:    There is no immunization history on file for this patient.             [1]   Past Medical History:  Diagnosis Date    Essential (primary) hypertension 2013    Hypertension    Personal history of other endocrine, nutritional and metabolic disease 2022    History of morbid obesity    Personal history of other specified conditions     History of abdominal pain   [2]   Past Surgical History:  Procedure Laterality Date     SECTION, LOW TRANSVERSE      x2    GASTRIC BYPASS N/A     gastric sleeve    OVARIAN CYST REMOVAL      with rght salpingectomy    UMBILICAL HERNIA REPAIR     [3]   Family History  Problem Relation Name Age of Onset    Breast cancer Mother  48    Hypertension Father     [4]   Allergies  Allergen Reactions    Ibuprofen Unknown     Due to gastric sleeve surgery    Influen Tr-Split  Vac (Pf) Anaphylaxis    Lisinopril Anaphylaxis and Angioedema   [5]   Current Outpatient Medications   Medication Sig Dispense Refill    amLODIPine (Norvasc) 10 mg tablet TAKE 1 TABLET BY MOUTH EVERY DAY 90 tablet 1    cholecalciferol (Vitamin D-3) 50 mcg (2,000 unit) capsule Take 1 capsule (50 mcg) by mouth once daily.      escitalopram (Lexapro) 20 mg tablet Take 1 tablet (20 mg) by mouth once daily. 90 tablet 1    ferrous sulfate 325 (65 Fe) MG tablet Take 1 tablet (65 mg of iron) by mouth once daily with a meal.      losartan-hydrochlorothiazide (Hyzaar) 100-12.5 mg tablet TAKE 1 TABLET BY MOUTH EVERY DAY 90 tablet 1    iron sucrose (Venofer) 200 mg iron/10 mL injection Infuse 10 mL (200 mg) over 5 minutes into a venous catheter 1 (one) time per week. 10 mL 1     No current facility-administered medications for this visit.

## 2025-06-12 LAB
25(OH)D3+25(OH)D2 SERPL-MCNC: 27 NG/ML (ref 30–100)
ALBUMIN SERPL-MCNC: 4.2 G/DL (ref 3.6–5.1)
ALBUMIN/CREAT UR: 6 MG/G CREAT
ALP SERPL-CCNC: 59 U/L (ref 31–125)
ALT SERPL-CCNC: 10 U/L (ref 6–29)
ANION GAP SERPL CALCULATED.4IONS-SCNC: 10 MMOL/L (CALC) (ref 7–17)
AST SERPL-CCNC: 13 U/L (ref 10–30)
BILIRUB SERPL-MCNC: 0.2 MG/DL (ref 0.2–1.2)
BUN SERPL-MCNC: 13 MG/DL (ref 7–25)
CALCIUM SERPL-MCNC: 9 MG/DL (ref 8.6–10.2)
CHLORIDE SERPL-SCNC: 105 MMOL/L (ref 98–110)
CHOLEST SERPL-MCNC: 145 MG/DL
CHOLEST/HDLC SERPL: 2.6 (CALC)
CO2 SERPL-SCNC: 22 MMOL/L (ref 20–32)
CREAT SERPL-MCNC: 0.94 MG/DL (ref 0.5–0.99)
CREAT UR-MCNC: 146 MG/DL (ref 20–275)
EGFRCR SERPLBLD CKD-EPI 2021: 79 ML/MIN/1.73M2
ERYTHROCYTE [DISTWIDTH] IN BLOOD BY AUTOMATED COUNT: 16.1 % (ref 11–15)
EST. AVERAGE GLUCOSE BLD GHB EST-MCNC: 108 MG/DL
EST. AVERAGE GLUCOSE BLD GHB EST-SCNC: 6 MMOL/L
FERRITIN SERPL-MCNC: 3 NG/ML (ref 16–154)
GLUCOSE SERPL-MCNC: 81 MG/DL (ref 65–99)
HBA1C MFR BLD: 5.4 %
HCT VFR BLD AUTO: 29.2 % (ref 35–45)
HDLC SERPL-MCNC: 56 MG/DL
HGB BLD-MCNC: 8.1 G/DL (ref 11.7–15.5)
LDLC SERPL CALC-MCNC: 69 MG/DL (CALC)
MCH RBC QN AUTO: 20.5 PG (ref 27–33)
MCHC RBC AUTO-ENTMCNC: 27.7 G/DL (ref 32–36)
MCV RBC AUTO: 73.7 FL (ref 80–100)
MICROALBUMIN UR-MCNC: 0.9 MG/DL
NONHDLC SERPL-MCNC: 89 MG/DL (CALC)
PLATELET # BLD AUTO: 306 THOUSAND/UL (ref 140–400)
PMV BLD REES-ECKER: 11.7 FL (ref 7.5–12.5)
POTASSIUM SERPL-SCNC: 3.4 MMOL/L (ref 3.5–5.3)
PROT SERPL-MCNC: 7.8 G/DL (ref 6.1–8.1)
RBC # BLD AUTO: 3.96 MILLION/UL (ref 3.8–5.1)
SODIUM SERPL-SCNC: 137 MMOL/L (ref 135–146)
TRIGL SERPL-MCNC: 123 MG/DL
TSH SERPL-ACNC: 1.95 MIU/L
WBC # BLD AUTO: 7.8 THOUSAND/UL (ref 3.8–10.8)

## 2025-06-13 DIAGNOSIS — E87.6 HYPOKALEMIA: Primary | ICD-10-CM

## 2025-06-13 RX ORDER — POTASSIUM CHLORIDE 20 MEQ/1
20 TABLET, EXTENDED RELEASE ORAL DAILY
Qty: 3 TABLET | Refills: 0 | Status: SHIPPED | OUTPATIENT
Start: 2025-06-13 | End: 2025-06-16

## 2025-06-13 NOTE — RESULT ENCOUNTER NOTE
Her hemoglobin is very low  She needs iron infusions  Please place orders  Also, potassium is slightly low  Sending potassium for 3 days

## 2025-06-14 ENCOUNTER — HOSPITAL ENCOUNTER (OUTPATIENT)
Dept: RADIOLOGY | Facility: CLINIC | Age: 40
Discharge: HOME | End: 2025-06-14
Payer: COMMERCIAL

## 2025-06-14 DIAGNOSIS — R92.8 ABNORMALITY OF LEFT BREAST ON SCREENING MAMMOGRAM: Primary | ICD-10-CM

## 2025-06-14 DIAGNOSIS — Z80.3 FAMILY HISTORY OF MALIGNANT NEOPLASM OF FEMALE BREAST: ICD-10-CM

## 2025-06-14 PROCEDURE — 77067 SCR MAMMO BI INCL CAD: CPT

## 2025-06-14 PROCEDURE — 77063 BREAST TOMOSYNTHESIS BI: CPT | Performed by: RADIOLOGY

## 2025-06-14 PROCEDURE — 77067 SCR MAMMO BI INCL CAD: CPT | Performed by: RADIOLOGY

## 2025-06-17 DIAGNOSIS — D50.8 OTHER IRON DEFICIENCY ANEMIA: ICD-10-CM

## 2025-06-17 DIAGNOSIS — D50.9 IRON DEFICIENCY ANEMIA, UNSPECIFIED IRON DEFICIENCY ANEMIA TYPE: Primary | ICD-10-CM

## 2025-06-17 RX ORDER — FAMOTIDINE 10 MG/ML
20 INJECTION, SOLUTION INTRAVENOUS ONCE AS NEEDED
OUTPATIENT
Start: 2025-06-17

## 2025-06-17 RX ORDER — DIPHENHYDRAMINE HYDROCHLORIDE 50 MG/ML
50 INJECTION, SOLUTION INTRAMUSCULAR; INTRAVENOUS AS NEEDED
OUTPATIENT
Start: 2025-06-17

## 2025-06-17 RX ORDER — ALBUTEROL SULFATE 0.83 MG/ML
3 SOLUTION RESPIRATORY (INHALATION) AS NEEDED
OUTPATIENT
Start: 2025-06-17

## 2025-06-17 RX ORDER — EPINEPHRINE 0.3 MG/.3ML
0.3 INJECTION SUBCUTANEOUS EVERY 5 MIN PRN
OUTPATIENT
Start: 2025-06-17

## 2025-06-19 ENCOUNTER — HOSPITAL ENCOUNTER (OUTPATIENT)
Dept: RADIOLOGY | Facility: CLINIC | Age: 40
Discharge: HOME | End: 2025-06-19
Payer: COMMERCIAL

## 2025-06-19 ENCOUNTER — TELEPHONE (OUTPATIENT)
Dept: OBSTETRICS AND GYNECOLOGY | Facility: HOSPITAL | Age: 40
End: 2025-06-19
Payer: COMMERCIAL

## 2025-06-19 DIAGNOSIS — R92.8 ABNORMALITY OF LEFT BREAST ON SCREENING MAMMOGRAM: ICD-10-CM

## 2025-06-19 DIAGNOSIS — R92.8 ABNORMAL MAMMOGRAM OF LEFT BREAST: Primary | ICD-10-CM

## 2025-06-19 PROCEDURE — 77061 BREAST TOMOSYNTHESIS UNI: CPT | Mod: LT

## 2025-06-19 PROCEDURE — 76642 ULTRASOUND BREAST LIMITED: CPT | Mod: LT

## 2025-06-19 PROCEDURE — 76982 USE 1ST TARGET LESION: CPT

## 2025-06-19 PROCEDURE — 76642 ULTRASOUND BREAST LIMITED: CPT | Mod: LEFT SIDE | Performed by: RADIOLOGY

## 2025-06-19 PROCEDURE — 77065 DX MAMMO INCL CAD UNI: CPT | Mod: LEFT SIDE | Performed by: RADIOLOGY

## 2025-06-19 PROCEDURE — 77061 BREAST TOMOSYNTHESIS UNI: CPT | Mod: LEFT SIDE | Performed by: RADIOLOGY

## 2025-06-19 NOTE — TELEPHONE ENCOUNTER
----- Message from Dennis Key sent at 6/19/2025 11:44 AM EDT -----  Please call the patient regarding her results.  Diagnostic mammogram with no worrisome findings. Six month follow up recommended-- I have placed the order. ES  ----- Message -----  From: Interface, Radiology Results In  Sent: 6/19/2025  10:30 AM EDT  To: Dennis Key MD

## 2025-06-19 NOTE — TELEPHONE ENCOUNTER
Called pt related to her Mammogram results. Pt identified by name and . Nurse reviewed imaging results with pt and recommended follow up. Pt verbalized understanding. No additional questions voiced.

## 2025-07-10 ENCOUNTER — INFUSION (OUTPATIENT)
Dept: HEMATOLOGY/ONCOLOGY | Facility: CLINIC | Age: 40
End: 2025-07-10
Payer: COMMERCIAL

## 2025-07-10 VITALS
HEART RATE: 86 BPM | TEMPERATURE: 97.7 F | WEIGHT: 229.06 LBS | DIASTOLIC BLOOD PRESSURE: 86 MMHG | SYSTOLIC BLOOD PRESSURE: 138 MMHG | RESPIRATION RATE: 18 BRPM | OXYGEN SATURATION: 99 % | BODY MASS INDEX: 36.97 KG/M2

## 2025-07-10 DIAGNOSIS — D50.8 OTHER IRON DEFICIENCY ANEMIA: ICD-10-CM

## 2025-07-10 PROCEDURE — 96374 THER/PROPH/DIAG INJ IV PUSH: CPT | Mod: INF

## 2025-07-10 PROCEDURE — 2500000004 HC RX 250 GENERAL PHARMACY W/ HCPCS (ALT 636 FOR OP/ED): Performed by: INTERNAL MEDICINE

## 2025-07-10 RX ORDER — ALBUTEROL SULFATE 0.83 MG/ML
3 SOLUTION RESPIRATORY (INHALATION) AS NEEDED
Status: DISCONTINUED | OUTPATIENT
Start: 2025-07-10 | End: 2025-07-10 | Stop reason: HOSPADM

## 2025-07-10 RX ORDER — EPINEPHRINE 0.3 MG/.3ML
0.3 INJECTION SUBCUTANEOUS EVERY 5 MIN PRN
OUTPATIENT
Start: 2025-07-13

## 2025-07-10 RX ORDER — DIPHENHYDRAMINE HYDROCHLORIDE 50 MG/ML
50 INJECTION, SOLUTION INTRAMUSCULAR; INTRAVENOUS AS NEEDED
Status: DISCONTINUED | OUTPATIENT
Start: 2025-07-10 | End: 2025-07-10 | Stop reason: HOSPADM

## 2025-07-10 RX ORDER — FAMOTIDINE 10 MG/ML
20 INJECTION, SOLUTION INTRAVENOUS ONCE AS NEEDED
Status: DISCONTINUED | OUTPATIENT
Start: 2025-07-10 | End: 2025-07-10 | Stop reason: HOSPADM

## 2025-07-10 RX ORDER — EPINEPHRINE 0.3 MG/.3ML
0.3 INJECTION SUBCUTANEOUS EVERY 5 MIN PRN
Status: DISCONTINUED | OUTPATIENT
Start: 2025-07-10 | End: 2025-07-10 | Stop reason: HOSPADM

## 2025-07-10 RX ORDER — ALBUTEROL SULFATE 0.83 MG/ML
3 SOLUTION RESPIRATORY (INHALATION) AS NEEDED
OUTPATIENT
Start: 2025-07-13

## 2025-07-10 RX ORDER — DIPHENHYDRAMINE HYDROCHLORIDE 50 MG/ML
50 INJECTION, SOLUTION INTRAMUSCULAR; INTRAVENOUS AS NEEDED
OUTPATIENT
Start: 2025-07-13

## 2025-07-10 RX ORDER — FAMOTIDINE 10 MG/ML
20 INJECTION, SOLUTION INTRAVENOUS ONCE AS NEEDED
OUTPATIENT
Start: 2025-07-13

## 2025-07-10 RX ADMIN — IRON SUCROSE 200 MG: 20 INJECTION, SOLUTION INTRAVENOUS at 16:51

## 2025-07-10 ASSESSMENT — PAIN SCALES - GENERAL: PAINLEVEL_OUTOF10: 0-NO PAIN

## 2025-07-11 ENCOUNTER — APPOINTMENT (OUTPATIENT)
Dept: PHARMACY | Facility: HOSPITAL | Age: 40
End: 2025-07-11
Payer: COMMERCIAL

## 2025-07-11 DIAGNOSIS — E66.812 CLASS 2 SEVERE OBESITY WITH SERIOUS COMORBIDITY AND BODY MASS INDEX (BMI) OF 35.0 TO 35.9 IN ADULT, UNSPECIFIED OBESITY TYPE: ICD-10-CM

## 2025-07-11 DIAGNOSIS — F41.9 ANXIETY: ICD-10-CM

## 2025-07-11 DIAGNOSIS — E66.01 CLASS 2 SEVERE OBESITY WITH SERIOUS COMORBIDITY AND BODY MASS INDEX (BMI) OF 35.0 TO 35.9 IN ADULT, UNSPECIFIED OBESITY TYPE: ICD-10-CM

## 2025-07-11 DIAGNOSIS — Z98.84 S/P BARIATRIC SURGERY: ICD-10-CM

## 2025-07-11 NOTE — PROGRESS NOTES
Clinical Pharmacy Appointment    Patient ID: Malu Rios is a 39 y.o. female who presents for GLP-1 cost assistance    Pt is here for First appointment.      Referring Provider: Joseline Remy, *  PCP: Joseline Remy MD   Last visit with PCP: 06/11/2025       HPI  Per last PCP appt -   Reports issues with her weight.  S/p bariatric surgery in 2019  Post surgery recovery weight 180  Slowly started gaining weight  Got worse with COVID  And now worse with return to work and inability to be more active.  Has a history of anxiety and depression  On Lexapro  Seeing a therapist, who recommended to talk about weight loss meds.      PMH  S/P bariatric surgery 2019  Anxiety   Atherosclerosis      Subjective       DISEASE MANAGEMENT ASSESSMENT:     WEIGHT LOSS  BMI Readings from Last 3 Encounters:   07/10/25 36.97 kg/m²   06/11/25 35.99 kg/m²   10/18/24 33.25 kg/m²      Starting weight: 222-228 lbs     Goal Weight: ~170-180 lbs.  Other Weight Loss Goals: NA    Lifestyle  Diet: Tries to monitor portion control  Physical Activity: Not discussed at this visit        Non-Pharmacological Therapy  Weight loss techniques attempted:  Surgical intervention: Bariatric surgery 2019    Pharmacological Therapy  Current Medications: None  Adverse Effects: NA  Previous Medications: None       Relevant PMH:  PMH of Pancreatitis? None  PMH of Retinopathy? None  PMH of MTC? None  PMH of MEN 2? None      Insurance coverage of weight-loss medications? No, [facility] plan exlcusion  Eligible for copay cards/programs? Yes, Zepbound pens/vials  Eligible for  PAP? No, no insurance coverage      PHARMACEUTICAL ASSESSMENT:    MEDICATION RECONCILIATION      Medication Documentation Review Audit       Reviewed by Livia Ramos RN (Registered Nurse) on 07/10/25 at 1628      Medication Order Taking? Sig Documenting Provider Last Dose Status   amLODIPine (Norvasc) 10 mg tablet [id number]  TAKE 1 TABLET BY MOUTH EVERY DAY Joseline PILLAI  MD Sandrita  Active   cholecalciferol (Vitamin D-3) 50 mcg (2,000 unit) capsule [id number]  Take 1 capsule (50 mcg) by mouth once daily. Historical Provider, MD  Active   escitalopram (Lexapro) 20 mg tablet [id number]  Take 1 tablet (20 mg) by mouth once daily. Reva Simmons DO  Active   ferrous sulfate 325 (65 Fe) MG tablet [id number]  Take 1 tablet (65 mg of iron) by mouth once daily with a meal. Historical Provider, MD  Active   iron sucrose (Venofer) 200 mg iron/10 mL injection [id number]  Infuse 10 mL (200 mg) over 5 minutes into a venous catheter 1 (one) time per week. Joseline Remy MD  Active   losartan-hydrochlorothiazide (Hyzaar) 100-12.5 mg tablet [id number]  TAKE 1 TABLET BY MOUTH EVERY DAY Joseline Remy MD  Active                      Objective   Allergies[1]  Social History     Social History Narrative    Not on file      Medication Review  Current Outpatient Medications   Medication Instructions    amLODIPine (NORVASC) 10 mg, oral, Daily    cholecalciferol (VITAMIN D-3) 50 mcg, Daily    escitalopram (LEXAPRO) 20 mg, oral, Daily    ferrous sulfate 325 (65 Fe) MG tablet 65 mg of elemental iron, Daily with breakfast    losartan-hydrochlorothiazide (Hyzaar) 100-12.5 mg tablet 1 tablet, oral, Daily    Venofer 200 mg, intravenous, Once Weekly      Vitals  BP Readings from Last 2 Encounters:   07/10/25 138/86   06/11/25 (!) 148/92     BMI Readings from Last 1 Encounters:   07/10/25 36.97 kg/m²      Labs  LIPIDS  Lab Results   Component Value Date    TRIG 123 06/11/2025    CHOL 145 06/11/2025    LDLCALC 69 06/11/2025    HDL 56 06/11/2025     A1C  Lab Results   Component Value Date    HGBA1C 5.4 06/11/2025    HGBA1C 4.9 01/23/2024    HGBA1C 5.4 09/20/2022     BMP  Lab Results   Component Value Date    CALCIUM 9.0 06/11/2025     06/11/2025    K 3.4 (L) 06/11/2025    CO2 22 06/11/2025     06/11/2025    BUN 13 06/11/2025    CREATININE 0.94 06/11/2025    EGFR 79 06/11/2025     LFTs  Lab  Results   Component Value Date    ALT 10 06/11/2025    AST 13 06/11/2025    ALKPHOS 59 06/11/2025    BILITOT 0.2 06/11/2025     FLP  Lab Results   Component Value Date    TRIG 123 06/11/2025    CHOL 145 06/11/2025    LDLF 58 09/20/2022    LDLCALC 69 06/11/2025    HDL 56 06/11/2025     Urine Microalbumin  Lab Results   Component Value Date    MICROALBCREA 6 06/11/2025     Weight Management  Wt Readings from Last 3 Encounters:   07/10/25 104 kg (229 lb 0.9 oz)   06/11/25 101 kg (223 lb)   10/18/24 93.4 kg (206 lb)      There is no height or weight on file to calculate BMI.         ASSESSMENT:    Zepbound Education:     Counseled patient on Zepbound MOA, expectations, side effects, duration of therapy, administration, and monitoring parameters.  Provided detailed dosing and administration counseling to ensure proper technique.   Reviewed Zepbound titration schedule, starting with 2.5 mg once weekly to a goal of 15 mg once weekly if tolerated  Counseled patient on the benefits of GLP-1ra glycemic control and weight loss  Reviewed storage requirements of Zepbound when not in use, and when to administer the medication if a dose is missed.  Advised patient that they may experience improved satiety after meals and portion sizes of meals may be reduced as doses of Zepound increase.      Assessment/Plan     DISCUSSION/NOTES:   No GLP-1 coverage for [facility] per patients insurance. Does not have diagnosis of T2DM. Discussed cash paying options, patient would like to proceed with Zepbound vials. No CI identified, counseled as stated above. Will moniotr portions closely due to [facility] bariatric surgery 2019. Will begin on Zepbound 2.5 mg weekly injection, counseled on payment and delivery coordination per Zepbound .   Lifestyle and weight loss goals discussed above. Diet and exercise to be discussed more in depth at follow up appointment.       Problem List Items Addressed This Visit       Anxiety    S/P bariatric surgery     Class 2 severe obesity with serious comorbidity and body mass index (BMI) of 35.0 to 35.9 in adult    No CI identified, discussed WL goals and counseled on medication. Patient to begin Zepbound 2.5 mg per  coupon. Will follow up in 3 weeks to assess tolerance.               PLAN:    RECOMMENDATIONS/PLAN:    BEGIN  Zepbound 2.5 mg weekly injection   RX sent to [facility]     Clinical Pharmacist follow up: 08/01/25 @1:20 PM  VAF/Application Expiration: No  Type of Encounter: Virtual    Thank you,  Ursula Gonzalez, PharmD   Meds PGY-1 Resident   [phone]      Continue all meds under the continuation of care with the referring provider and clinical pharmacy team.    Verbal consent to manage patient's drug therapy was obtained from the patient . They were informed they may decline to participate or withdraw from participation in pharmacy services at any time.    Continue all meds under the continuation of care with the referring provider and clinical pharmacy team.           [1]   Allergies  Allergen Reactions    Ibuprofen Unknown     Due to gastric sleeve surgery    Influen Tr-Split 2005 Vac (Pf) Anaphylaxis    Lisinopril Anaphylaxis and Angioedema

## 2025-07-11 NOTE — ASSESSMENT & PLAN NOTE
No CI identified, discussed WL goals and counseled on medication. Patient to begin Zepbound 2.5 mg per  coupon. Will follow up in 3 weeks to assess tolerance.

## 2025-07-14 RX ORDER — TIRZEPATIDE 2.5 MG/.5ML
2.5 INJECTION, SOLUTION SUBCUTANEOUS
Qty: 2 ML | Refills: 1 | Status: SHIPPED | OUTPATIENT
Start: 2025-07-14

## 2025-07-17 ENCOUNTER — INFUSION (OUTPATIENT)
Dept: HEMATOLOGY/ONCOLOGY | Facility: CLINIC | Age: 40
End: 2025-07-17
Payer: COMMERCIAL

## 2025-07-17 VITALS
BODY MASS INDEX: 36.67 KG/M2 | SYSTOLIC BLOOD PRESSURE: 134 MMHG | OXYGEN SATURATION: 98 % | RESPIRATION RATE: 18 BRPM | WEIGHT: 227.2 LBS | HEART RATE: 77 BPM | TEMPERATURE: 97.2 F | DIASTOLIC BLOOD PRESSURE: 88 MMHG

## 2025-07-17 DIAGNOSIS — D50.8 OTHER IRON DEFICIENCY ANEMIA: ICD-10-CM

## 2025-07-17 PROCEDURE — 2500000004 HC RX 250 GENERAL PHARMACY W/ HCPCS (ALT 636 FOR OP/ED): Performed by: INTERNAL MEDICINE

## 2025-07-17 PROCEDURE — 96374 THER/PROPH/DIAG INJ IV PUSH: CPT | Mod: INF

## 2025-07-17 RX ORDER — ALBUTEROL SULFATE 0.83 MG/ML
3 SOLUTION RESPIRATORY (INHALATION) AS NEEDED
OUTPATIENT
Start: 2025-07-19

## 2025-07-17 RX ORDER — FAMOTIDINE 10 MG/ML
20 INJECTION, SOLUTION INTRAVENOUS ONCE AS NEEDED
OUTPATIENT
Start: 2025-07-19

## 2025-07-17 RX ORDER — DIPHENHYDRAMINE HYDROCHLORIDE 50 MG/ML
50 INJECTION, SOLUTION INTRAMUSCULAR; INTRAVENOUS AS NEEDED
OUTPATIENT
Start: 2025-07-19

## 2025-07-17 RX ORDER — DIPHENHYDRAMINE HYDROCHLORIDE 50 MG/ML
50 INJECTION, SOLUTION INTRAMUSCULAR; INTRAVENOUS AS NEEDED
Status: DISCONTINUED | OUTPATIENT
Start: 2025-07-17 | End: 2025-07-17 | Stop reason: HOSPADM

## 2025-07-17 RX ORDER — EPINEPHRINE 0.3 MG/.3ML
0.3 INJECTION SUBCUTANEOUS EVERY 5 MIN PRN
Status: DISCONTINUED | OUTPATIENT
Start: 2025-07-17 | End: 2025-07-17 | Stop reason: HOSPADM

## 2025-07-17 RX ORDER — FAMOTIDINE 10 MG/ML
20 INJECTION, SOLUTION INTRAVENOUS ONCE AS NEEDED
Status: DISCONTINUED | OUTPATIENT
Start: 2025-07-17 | End: 2025-07-17 | Stop reason: HOSPADM

## 2025-07-17 RX ORDER — ALBUTEROL SULFATE 0.83 MG/ML
3 SOLUTION RESPIRATORY (INHALATION) AS NEEDED
Status: DISCONTINUED | OUTPATIENT
Start: 2025-07-17 | End: 2025-07-17 | Stop reason: HOSPADM

## 2025-07-17 RX ORDER — EPINEPHRINE 0.3 MG/.3ML
0.3 INJECTION SUBCUTANEOUS EVERY 5 MIN PRN
OUTPATIENT
Start: 2025-07-19

## 2025-07-17 RX ADMIN — IRON SUCROSE 200 MG: 20 INJECTION, SOLUTION INTRAVENOUS at 16:26

## 2025-07-17 ASSESSMENT — PAIN SCALES - GENERAL: PAINLEVEL_OUTOF10: 0-NO PAIN

## 2025-07-21 ENCOUNTER — INFUSION (OUTPATIENT)
Dept: HEMATOLOGY/ONCOLOGY | Facility: CLINIC | Age: 40
End: 2025-07-21
Payer: COMMERCIAL

## 2025-07-21 VITALS
OXYGEN SATURATION: 99 % | TEMPERATURE: 98.1 F | DIASTOLIC BLOOD PRESSURE: 90 MMHG | SYSTOLIC BLOOD PRESSURE: 148 MMHG | HEART RATE: 64 BPM | BODY MASS INDEX: 36.32 KG/M2 | RESPIRATION RATE: 18 BRPM | WEIGHT: 225 LBS

## 2025-07-21 DIAGNOSIS — D50.8 OTHER IRON DEFICIENCY ANEMIA: ICD-10-CM

## 2025-07-21 PROCEDURE — 2500000004 HC RX 250 GENERAL PHARMACY W/ HCPCS (ALT 636 FOR OP/ED): Performed by: INTERNAL MEDICINE

## 2025-07-21 PROCEDURE — 96374 THER/PROPH/DIAG INJ IV PUSH: CPT | Mod: INF

## 2025-07-21 RX ORDER — DIPHENHYDRAMINE HYDROCHLORIDE 50 MG/ML
50 INJECTION, SOLUTION INTRAMUSCULAR; INTRAVENOUS AS NEEDED
OUTPATIENT
Start: 2025-07-23

## 2025-07-21 RX ORDER — ALBUTEROL SULFATE 0.83 MG/ML
3 SOLUTION RESPIRATORY (INHALATION) AS NEEDED
OUTPATIENT
Start: 2025-07-23

## 2025-07-21 RX ORDER — FAMOTIDINE 10 MG/ML
20 INJECTION, SOLUTION INTRAVENOUS ONCE AS NEEDED
OUTPATIENT
Start: 2025-07-23

## 2025-07-21 RX ORDER — ALBUTEROL SULFATE 0.83 MG/ML
3 SOLUTION RESPIRATORY (INHALATION) AS NEEDED
Status: DISCONTINUED | OUTPATIENT
Start: 2025-07-21 | End: 2025-07-21 | Stop reason: HOSPADM

## 2025-07-21 RX ORDER — EPINEPHRINE 0.3 MG/.3ML
0.3 INJECTION SUBCUTANEOUS EVERY 5 MIN PRN
Status: DISCONTINUED | OUTPATIENT
Start: 2025-07-21 | End: 2025-07-21 | Stop reason: HOSPADM

## 2025-07-21 RX ORDER — EPINEPHRINE 0.3 MG/.3ML
0.3 INJECTION SUBCUTANEOUS EVERY 5 MIN PRN
OUTPATIENT
Start: 2025-07-23

## 2025-07-21 RX ORDER — FAMOTIDINE 10 MG/ML
20 INJECTION, SOLUTION INTRAVENOUS ONCE AS NEEDED
Status: DISCONTINUED | OUTPATIENT
Start: 2025-07-21 | End: 2025-07-21 | Stop reason: HOSPADM

## 2025-07-21 RX ORDER — DIPHENHYDRAMINE HYDROCHLORIDE 50 MG/ML
50 INJECTION, SOLUTION INTRAMUSCULAR; INTRAVENOUS AS NEEDED
Status: DISCONTINUED | OUTPATIENT
Start: 2025-07-21 | End: 2025-07-21 | Stop reason: HOSPADM

## 2025-07-21 RX ADMIN — IRON SUCROSE 200 MG: 20 INJECTION, SOLUTION INTRAVENOUS at 14:02

## 2025-07-21 ASSESSMENT — PAIN SCALES - GENERAL
PAINLEVEL_OUTOF10: 0-NO PAIN
PAINLEVEL_OUTOF10: 0-NO PAIN

## 2025-07-21 ASSESSMENT — ENCOUNTER SYMPTOMS
LOSS OF SENSATION IN FEET: 0
DEPRESSION: 0
OCCASIONAL FEELINGS OF UNSTEADINESS: 0

## 2025-07-24 ENCOUNTER — APPOINTMENT (OUTPATIENT)
Dept: HEMATOLOGY/ONCOLOGY | Facility: CLINIC | Age: 40
End: 2025-07-24
Payer: COMMERCIAL

## 2025-07-31 ENCOUNTER — INFUSION (OUTPATIENT)
Dept: HEMATOLOGY/ONCOLOGY | Facility: CLINIC | Age: 40
End: 2025-07-31
Payer: COMMERCIAL

## 2025-07-31 VITALS
RESPIRATION RATE: 17 BRPM | SYSTOLIC BLOOD PRESSURE: 132 MMHG | TEMPERATURE: 97.2 F | BODY MASS INDEX: 35.75 KG/M2 | HEART RATE: 72 BPM | DIASTOLIC BLOOD PRESSURE: 79 MMHG | OXYGEN SATURATION: 99 % | WEIGHT: 221.5 LBS

## 2025-07-31 DIAGNOSIS — D50.8 OTHER IRON DEFICIENCY ANEMIA: ICD-10-CM

## 2025-07-31 PROCEDURE — 2500000004 HC RX 250 GENERAL PHARMACY W/ HCPCS (ALT 636 FOR OP/ED): Performed by: INTERNAL MEDICINE

## 2025-07-31 PROCEDURE — 96374 THER/PROPH/DIAG INJ IV PUSH: CPT | Mod: INF

## 2025-07-31 RX ORDER — DIPHENHYDRAMINE HYDROCHLORIDE 50 MG/ML
50 INJECTION, SOLUTION INTRAMUSCULAR; INTRAVENOUS AS NEEDED
Status: DISCONTINUED | OUTPATIENT
Start: 2025-07-31 | End: 2025-07-31 | Stop reason: HOSPADM

## 2025-07-31 RX ORDER — DIPHENHYDRAMINE HYDROCHLORIDE 50 MG/ML
50 INJECTION, SOLUTION INTRAMUSCULAR; INTRAVENOUS AS NEEDED
OUTPATIENT
Start: 2025-08-02

## 2025-07-31 RX ORDER — EPINEPHRINE 0.3 MG/.3ML
0.3 INJECTION SUBCUTANEOUS EVERY 5 MIN PRN
OUTPATIENT
Start: 2025-08-02

## 2025-07-31 RX ORDER — FAMOTIDINE 10 MG/ML
20 INJECTION, SOLUTION INTRAVENOUS ONCE AS NEEDED
OUTPATIENT
Start: 2025-08-02

## 2025-07-31 RX ORDER — ALBUTEROL SULFATE 0.83 MG/ML
3 SOLUTION RESPIRATORY (INHALATION) AS NEEDED
Status: DISCONTINUED | OUTPATIENT
Start: 2025-07-31 | End: 2025-07-31 | Stop reason: HOSPADM

## 2025-07-31 RX ORDER — EPINEPHRINE 0.3 MG/.3ML
0.3 INJECTION SUBCUTANEOUS EVERY 5 MIN PRN
Status: DISCONTINUED | OUTPATIENT
Start: 2025-07-31 | End: 2025-07-31 | Stop reason: HOSPADM

## 2025-07-31 RX ORDER — ALBUTEROL SULFATE 0.83 MG/ML
3 SOLUTION RESPIRATORY (INHALATION) AS NEEDED
OUTPATIENT
Start: 2025-08-02

## 2025-07-31 RX ORDER — FAMOTIDINE 10 MG/ML
20 INJECTION, SOLUTION INTRAVENOUS ONCE AS NEEDED
Status: DISCONTINUED | OUTPATIENT
Start: 2025-07-31 | End: 2025-07-31 | Stop reason: HOSPADM

## 2025-07-31 RX ADMIN — IRON SUCROSE 200 MG: 20 INJECTION, SOLUTION INTRAVENOUS at 16:20

## 2025-07-31 ASSESSMENT — PAIN SCALES - GENERAL: PAINLEVEL_OUTOF10: 0-NO PAIN

## 2025-08-01 ENCOUNTER — APPOINTMENT (OUTPATIENT)
Dept: PHARMACY | Facility: HOSPITAL | Age: 40
End: 2025-08-01
Payer: COMMERCIAL

## 2025-08-01 DIAGNOSIS — E66.01 CLASS 2 SEVERE OBESITY WITH SERIOUS COMORBIDITY AND BODY MASS INDEX (BMI) OF 35.0 TO 35.9 IN ADULT, UNSPECIFIED OBESITY TYPE: Primary | ICD-10-CM

## 2025-08-01 DIAGNOSIS — E66.812 CLASS 2 SEVERE OBESITY WITH SERIOUS COMORBIDITY AND BODY MASS INDEX (BMI) OF 35.0 TO 35.9 IN ADULT, UNSPECIFIED OBESITY TYPE: Primary | ICD-10-CM

## 2025-08-01 RX ORDER — TIRZEPATIDE 5 MG/.5ML
5 INJECTION, SOLUTION SUBCUTANEOUS
Qty: 2 ML | Refills: 1 | Status: SHIPPED | OUTPATIENT
Start: 2025-08-01

## 2025-08-01 NOTE — PROGRESS NOTES
Clinical Pharmacy Appointment    Patient ID: Malu Rios is a 39 y.o. female who presents for weight management    Pt is here for Follow Up appointment.      Referring Provider: Joseline Remy, *  PCP: Joseline Remy MD   Last visit with PCP: 06/11/2025       HPI  Per last PCP appt -   Reports issues with her weight.  S/p bariatric surgery in 2019  Post surgery recovery weight 180  Slowly started gaining weight  Got worse with COVID  And now worse with return to work and inability to be more active.  Has a history of anxiety and depression  On Lexapro  Seeing a therapist, who recommended to talk about weight loss meds.      PMH  S/P bariatric surgery 2019  Anxiety   Atherosclerosis      Subjective       DISEASE MANAGEMENT ASSESSMENT:     WEIGHT LOSS  BMI Readings from Last 3 Encounters:   07/31/25 35.75 kg/m²   07/21/25 36.32 kg/m²   07/17/25 36.67 kg/m²      Starting weight: 222-228 lbs   Has lost 9 lb so far  Goal Weight: ~170-180 lbs.  Other Weight Loss Goals: NA    Lifestyle  Diet: Tries to monitor portion control  Physical Activity: Not discussed at this visit      Non-Pharmacological Therapy  Weight loss techniques attempted:  Surgical intervention: Bariatric surgery 2019    Pharmacological Therapy  Current Medications: Zepbound 2.5 mg weekly   Adverse Effects: some constipation but unsure if it is from Zepbound (also on iron), uses Miralax with relief  Previous Medications: None       Relevant PMH:  PMH of Pancreatitis? None  PMH of Retinopathy? None  PMH of MTC? None  PMH of MEN 2? None      Insurance coverage of weight-loss medications? No, [facility] plan exlcusion  Eligible for copay cards/programs? Yes, Zepbound pens/vials  Eligible for  PAP? No, no insurance coverage      PHARMACEUTICAL ASSESSMENT:    MEDICATION RECONCILIATION      Medication Documentation Review Audit       Reviewed by Alexia Hoskins MA (Medical Assistant) on 07/21/25 at 1308      Medication Order Taking? Sig  Documenting Provider Last Dose Status   amLODIPine (Norvasc) 10 mg tablet [id number]  TAKE 1 TABLET BY MOUTH EVERY DAY Joseline Remy MD  Active   cholecalciferol (Vitamin D-3) 50 mcg (2,000 unit) capsule [id number]  Take 1 capsule (50 mcg) by mouth once daily. Historical Provider, MD  Active   escitalopram (Lexapro) 20 mg tablet [id number]  Take 1 tablet (20 mg) by mouth once daily. Reva Simmons DO  Active   ferrous sulfate 325 (65 Fe) MG tablet [id number]  Take 1 tablet by mouth once daily with breakfast. Historical Provider, MD  Active   iron sucrose (Venofer) 200 mg iron/10 mL injection [id number]  Infuse 10 mL (200 mg) over 5 minutes into a venous catheter 1 (one) time per week. Joseline Remy MD  Active   losartan-hydrochlorothiazide (Hyzaar) 100-12.5 mg tablet [id number]  TAKE 1 TABLET BY MOUTH EVERY DAY Joseline Remy MD  Active   tirzepatide, weight loss, (Zepbound) 2.5 mg/0.5 mL solution [id number]  Inject 2.5 mg under the skin every 7 days. Joseline Remy MD  Active                      Objective   Allergies[1]  Social History     Social History Narrative    Not on file      Medication Review  Current Outpatient Medications   Medication Instructions    amLODIPine (NORVASC) 10 mg, oral, Daily    cholecalciferol (VITAMIN D-3) 50 mcg, Daily    escitalopram (LEXAPRO) 20 mg, oral, Daily    ferrous sulfate 325 (65 Fe) MG tablet 65 mg of elemental iron, Daily with breakfast    losartan-hydrochlorothiazide (Hyzaar) 100-12.5 mg tablet 1 tablet, oral, Daily    Venofer 200 mg, intravenous, Once Weekly    Zepbound 5 mg, subcutaneous, Every 7 days      Vitals  BP Readings from Last 2 Encounters:   07/31/25 132/79   07/21/25 148/90     BMI Readings from Last 1 Encounters:   07/31/25 35.75 kg/m²      Labs  LIPIDS  Lab Results   Component Value Date    TRIG 123 06/11/2025    CHOL 145 06/11/2025    LDLCALC 69 06/11/2025    HDL 56 06/11/2025     A1C  Lab Results   Component Value Date    HGBA1C 5.4  06/11/2025    HGBA1C 4.9 01/23/2024    HGBA1C 5.4 09/20/2022     BMP  Lab Results   Component Value Date    CALCIUM 9.0 06/11/2025     06/11/2025    K 3.4 (L) 06/11/2025    CO2 22 06/11/2025     06/11/2025    BUN 13 06/11/2025    CREATININE 0.94 06/11/2025    EGFR 79 06/11/2025     LFTs  Lab Results   Component Value Date    ALT 10 06/11/2025    AST 13 06/11/2025    ALKPHOS 59 06/11/2025    BILITOT 0.2 06/11/2025     FLP  Lab Results   Component Value Date    TRIG 123 06/11/2025    CHOL 145 06/11/2025    LDLF 58 09/20/2022    LDLCALC 69 06/11/2025    HDL 56 06/11/2025     Urine Microalbumin  Lab Results   Component Value Date    MICROALBCREA 6 06/11/2025     Weight Management  Wt Readings from Last 3 Encounters:   07/31/25 100 kg (221 lb 8 oz)   07/21/25 102 kg (225 lb)   07/17/25 103 kg (227 lb 3.2 oz)      There is no height or weight on file to calculate BMI.         ASSESSMENT:    Zepbound Education:     Counseled patient on Zepbound MOA, expectations, side effects, duration of therapy, administration, and monitoring parameters.  Provided detailed dosing and administration counseling to ensure proper technique.   Reviewed Zepbound titration schedule, starting with 2.5 mg once weekly to a goal of 15 mg once weekly if tolerated  Counseled patient on the benefits of GLP-1ra glycemic control and weight loss  Reviewed storage requirements of Zepbound when not in use, and when to administer the medication if a dose is missed.  Advised patient that they may experience improved satiety after meals and portion sizes of meals may be reduced as doses of Zepound increase.      Assessment/Plan     DISCUSSION/NOTES:   No GLP-1 coverage for WL per patients insurance. Does not have diagnosis of T2DM. Discussed cash paying options, patient would like to proceed with Zepbound vials. No CI identified, counseled as stated above. Will moniotr portions closely due to [facility] bariatric surgery 2019. Will begin on Zepbound 2.5  mg weekly injection, counseled on payment and delivery coordination per Zepbound .       Problem List Items Addressed This Visit       Class 2 severe obesity with serious comorbidity and body mass index (BMI) of 35.0 to 35.9 in adult - Primary    Relevant Medications    tirzepatide, weight loss, (Zepbound) 5 mg/0.5 mL solution    Other Relevant Orders    Referral to Clinical Pharmacy       PLAN:    Tolerating Zepbound 2.5 mg well, noticing appetite suppression and weight loss.  Reports mild constipation which she is unsure if it is due to Zepbound as she is also on iron supplementation, she uses Miralax with relief.   Would like to increase dose for additional weight loss potential.     RECOMMENDATIONS/PLAN:    INCREASE  Zepbound 5 mg weekly injection   RX sent to [facility]     Clinical Pharmacist follow up: 08/01/25 @1:20 PM  VAF/Application Expiration: No  Type of Encounter: Vincenzo HopsonD  Clinical Pharmacy Specialist, Primary Care   [phone]    Continue all meds under the continuation of care with the referring provider and clinical pharmacy team.    Verbal consent to manage patient's drug therapy was obtained from the patient . They were informed they may decline to participate or withdraw from participation in pharmacy services at any time.    Continue all meds under the continuation of care with the referring provider and clinical pharmacy team.         [1]   Allergies  Allergen Reactions    Ibuprofen Unknown     Due to gastric sleeve surgery    Influen Tr-Split 2005 Vac (Pf) Anaphylaxis    Lisinopril Anaphylaxis and Angioedema

## 2025-08-06 DIAGNOSIS — E66.812 CLASS 2 SEVERE OBESITY WITH SERIOUS COMORBIDITY AND BODY MASS INDEX (BMI) OF 35.0 TO 35.9 IN ADULT, UNSPECIFIED OBESITY TYPE: ICD-10-CM

## 2025-08-06 DIAGNOSIS — E66.01 CLASS 2 SEVERE OBESITY WITH SERIOUS COMORBIDITY AND BODY MASS INDEX (BMI) OF 35.0 TO 35.9 IN ADULT, UNSPECIFIED OBESITY TYPE: ICD-10-CM

## 2025-08-06 RX ORDER — TIRZEPATIDE 5 MG/.5ML
5 INJECTION, SOLUTION SUBCUTANEOUS
Qty: 2 ML | Refills: 1 | Status: SHIPPED | OUTPATIENT
Start: 2025-08-06 | End: 2025-08-06

## 2025-08-06 RX ORDER — TIRZEPATIDE 5 MG/.5ML
INJECTION, SOLUTION SUBCUTANEOUS
Qty: 2 EACH | Refills: 1 | Status: SHIPPED | OUTPATIENT
Start: 2025-08-06

## 2025-08-07 ENCOUNTER — INFUSION (OUTPATIENT)
Dept: HEMATOLOGY/ONCOLOGY | Facility: CLINIC | Age: 40
End: 2025-08-07
Payer: COMMERCIAL

## 2025-08-07 VITALS
OXYGEN SATURATION: 98 % | BODY MASS INDEX: 35.33 KG/M2 | HEART RATE: 66 BPM | WEIGHT: 218.92 LBS | DIASTOLIC BLOOD PRESSURE: 82 MMHG | RESPIRATION RATE: 18 BRPM | TEMPERATURE: 98.6 F | SYSTOLIC BLOOD PRESSURE: 123 MMHG

## 2025-08-07 DIAGNOSIS — D50.8 OTHER IRON DEFICIENCY ANEMIA: ICD-10-CM

## 2025-08-07 PROCEDURE — 96374 THER/PROPH/DIAG INJ IV PUSH: CPT | Mod: INF

## 2025-08-07 PROCEDURE — 96365 THER/PROPH/DIAG IV INF INIT: CPT | Mod: INF

## 2025-08-07 PROCEDURE — 2500000004 HC RX 250 GENERAL PHARMACY W/ HCPCS (ALT 636 FOR OP/ED): Performed by: INTERNAL MEDICINE

## 2025-08-07 RX ORDER — EPINEPHRINE 0.3 MG/.3ML
0.3 INJECTION SUBCUTANEOUS EVERY 5 MIN PRN
Status: DISCONTINUED | OUTPATIENT
Start: 2025-08-07 | End: 2025-08-07 | Stop reason: HOSPADM

## 2025-08-07 RX ORDER — DIPHENHYDRAMINE HYDROCHLORIDE 50 MG/ML
50 INJECTION, SOLUTION INTRAMUSCULAR; INTRAVENOUS AS NEEDED
Status: DISCONTINUED | OUTPATIENT
Start: 2025-08-07 | End: 2025-08-07 | Stop reason: HOSPADM

## 2025-08-07 RX ORDER — DIPHENHYDRAMINE HYDROCHLORIDE 50 MG/ML
50 INJECTION, SOLUTION INTRAMUSCULAR; INTRAVENOUS AS NEEDED
OUTPATIENT
Start: 2025-08-09

## 2025-08-07 RX ORDER — ALBUTEROL SULFATE 0.83 MG/ML
3 SOLUTION RESPIRATORY (INHALATION) AS NEEDED
Status: DISCONTINUED | OUTPATIENT
Start: 2025-08-07 | End: 2025-08-07 | Stop reason: HOSPADM

## 2025-08-07 RX ORDER — ALBUTEROL SULFATE 0.83 MG/ML
3 SOLUTION RESPIRATORY (INHALATION) AS NEEDED
OUTPATIENT
Start: 2025-08-09

## 2025-08-07 RX ORDER — FAMOTIDINE 10 MG/ML
20 INJECTION, SOLUTION INTRAVENOUS ONCE AS NEEDED
OUTPATIENT
Start: 2025-08-09

## 2025-08-07 RX ORDER — EPINEPHRINE 0.3 MG/.3ML
0.3 INJECTION SUBCUTANEOUS EVERY 5 MIN PRN
OUTPATIENT
Start: 2025-08-09

## 2025-08-07 RX ORDER — FAMOTIDINE 10 MG/ML
20 INJECTION, SOLUTION INTRAVENOUS ONCE AS NEEDED
Status: DISCONTINUED | OUTPATIENT
Start: 2025-08-07 | End: 2025-08-07 | Stop reason: HOSPADM

## 2025-08-07 RX ADMIN — IRON SUCROSE 200 MG: 20 INJECTION, SOLUTION INTRAVENOUS at 16:22

## 2025-08-07 ASSESSMENT — PAIN SCALES - GENERAL: PAINLEVEL_OUTOF10: 0-NO PAIN

## 2025-08-29 ENCOUNTER — APPOINTMENT (OUTPATIENT)
Dept: PHARMACY | Facility: HOSPITAL | Age: 40
End: 2025-08-29
Payer: COMMERCIAL

## 2025-08-29 DIAGNOSIS — E66.812 CLASS 2 SEVERE OBESITY WITH SERIOUS COMORBIDITY AND BODY MASS INDEX (BMI) OF 35.0 TO 35.9 IN ADULT, UNSPECIFIED OBESITY TYPE: ICD-10-CM

## 2025-08-29 DIAGNOSIS — E66.01 CLASS 2 SEVERE OBESITY WITH SERIOUS COMORBIDITY AND BODY MASS INDEX (BMI) OF 35.0 TO 35.9 IN ADULT, UNSPECIFIED OBESITY TYPE: ICD-10-CM

## 2025-08-29 RX ORDER — TIRZEPATIDE 5 MG/.5ML
5 INJECTION, SOLUTION SUBCUTANEOUS
Qty: 2 ML | Refills: 2 | Status: SHIPPED | OUTPATIENT
Start: 2025-08-29

## 2025-09-12 ENCOUNTER — APPOINTMENT (OUTPATIENT)
Dept: PRIMARY CARE | Facility: CLINIC | Age: 40
End: 2025-09-12
Payer: COMMERCIAL

## 2025-10-23 ENCOUNTER — APPOINTMENT (OUTPATIENT)
Dept: PHARMACY | Facility: HOSPITAL | Age: 40
End: 2025-10-23
Payer: COMMERCIAL